# Patient Record
Sex: FEMALE | Race: WHITE | URBAN - METROPOLITAN AREA
[De-identification: names, ages, dates, MRNs, and addresses within clinical notes are randomized per-mention and may not be internally consistent; named-entity substitution may affect disease eponyms.]

---

## 2017-01-06 ENCOUNTER — OFFICE VISIT (OUTPATIENT)
Dept: FAMILY MEDICINE CLINIC | Age: 72
End: 2017-01-06

## 2017-01-06 VITALS
BODY MASS INDEX: 28.49 KG/M2 | TEMPERATURE: 97.6 F | SYSTOLIC BLOOD PRESSURE: 150 MMHG | WEIGHT: 160.8 LBS | OXYGEN SATURATION: 96 % | HEART RATE: 81 BPM | RESPIRATION RATE: 18 BRPM | HEIGHT: 63 IN | DIASTOLIC BLOOD PRESSURE: 92 MMHG

## 2017-01-06 DIAGNOSIS — E78.00 PURE HYPERCHOLESTEROLEMIA: ICD-10-CM

## 2017-01-06 DIAGNOSIS — E55.9 HYPOVITAMINOSIS D: ICD-10-CM

## 2017-01-06 DIAGNOSIS — I10 ESSENTIAL HYPERTENSION: Primary | ICD-10-CM

## 2017-01-06 RX ORDER — METOPROLOL SUCCINATE 50 MG/1
50 TABLET, EXTENDED RELEASE ORAL DAILY
Qty: 90 TAB | Refills: 1 | Status: SHIPPED | OUTPATIENT
Start: 2017-01-06 | End: 2017-03-27 | Stop reason: SDUPTHER

## 2017-01-06 NOTE — MR AVS SNAPSHOT
Visit Information Date & Time Provider Department Dept. Phone Encounter #  
 1/6/2017 11:45 AM Georgia Clemente, Talha E Sandra Miller [phone] [id number] Upcoming Health Maintenance Date Due Pneumococcal 65+ Low/Medium Risk (2 of 2 - PPSV23) 2/22/2015 MEDICARE YEARLY EXAM 3/8/2017 DTaP/Tdap/Td series (2 - Td) 8/25/2017 GLAUCOMA SCREENING Q2Y 10/23/2017 BREAST CANCER SCRN MAMMOGRAM 4/21/2018 COLONOSCOPY 8/12/2024 Allergies as of 1/6/2017  Review Complete On: 1/6/2017 By: Matheus Vela LPN Severity Noted Reaction Type Reactions Penicillin G  01/20/2010   Side Effect Rash Current Immunizations  Reviewed on 11/2/2016 Name Date Influenza High Dose Vaccine PF 9/12/2016, 9/24/2015, 9/25/2013, 9/25/2013 Influenza Vaccine 9/15/2014 Influenza Vaccine Whole 9/20/2012 Pneumococcal Vaccine (Unspecified Type) 2/22/2010 Tdap 8/25/2007 Zoster Vaccine, Live 5/13/2010 Not reviewed this visit You Were Diagnosed With   
  
 Codes Comments Essential hypertension    -  Primary ICD-10-CM: I10 
ICD-9-CM: 401.9 Pure hypercholesterolemia     ICD-10-CM: E78.00 ICD-9-CM: 272.0 Vitals BP Pulse Temp Resp Height(growth percentile) Weight(growth percentile) (!) 150/92 81 97.6 °F (36.4 °C) (Oral) 18 5' 3\" (1.6 m) 160 lb 12.8 oz (72.9 kg) SpO2 BMI OB Status Smoking Status 96% 28.48 kg/m2 Hysterectomy Never Smoker Vitals History BMI and BSA Data Body Mass Index Body Surface Area  
 28.48 kg/m 2 1.8 m 2 Preferred Pharmacy Pharmacy Name Phone ON [address], [address] [facility] Ariadna Clark Your Updated Medication List  
  
   
This list is accurate as of: 1/6/17 12:18 PM.  Always use your most recent med list.  
  
  
  
  
 aspirin 81 mg chewable tablet Take 81 mg by mouth daily. atorvastatin 20 mg tablet Commonly known as:  LIPITOR Take 1 Tab by mouth daily. CALTRATE 600 600 mg (1,500 mg) tablet Take  by mouth two (2) times a day. estrogens (conjugated) 0.45 mg tablet Commonly known as:  PREMARIN Take 1 Tab by mouth daily. FISH OIL 1,000 mg Cap Generic drug:  omega-3 fatty acids-vitamin e Take  by mouth daily. BNLJMZEX-HCDQTUMRAS-FL GLYCN-C PO Take  by mouth daily. metoprolol succinate 50 mg XL tablet Commonly known as:  TOPROL-XL Take 1 Tab by mouth daily. multivitamin tablet Commonly known as:  ONE A DAY Take 1 Tab by mouth daily. VITAMIN B-6 100 mg tablet Generic drug:  pyridoxine (vitamin B6) Take 100 mg by mouth daily. VITAMIN D 2,000 unit Cap capsule Generic drug:  Cholecalciferol (Vitamin D3) Take  by mouth daily. Prescriptions Sent to Pharmacy Refills  
 metoprolol succinate (TOPROL-XL) 50 mg XL tablet 1 Sig: Take 1 Tab by mouth daily. Class: Normal  
 Pharmacy: On [address] #: [phone] Route: Oral  
  
Patient Instructions High Blood Pressure: Care Instructions Your Care Instructions If your blood pressure is usually above 140/90, you have high blood pressure, or hypertension. That means the top number is 140 or higher or the bottom number is 90 or higher, or both. Despite what a lot of people think, high blood pressure usually doesn't cause headaches or make you feel dizzy or lightheaded. It usually has no symptoms. But it does increase your risk for heart attack, stroke, and kidney or eye damage. The higher your blood pressure, the more your risk increases. Your doctor will give you a goal for your blood pressure. Your goal will be based on your health and your age. An example of a goal is to keep your blood pressure below 140/90.  
Lifestyle changes, such as eating healthy and being active, are always important to help lower blood pressure. You might also take medicine to reach your blood pressure goal. 
Follow-up care is a key part of your treatment and safety. Be sure to make and go to all appointments, and call your doctor if you are having problems. It's also a good idea to know your test results and keep a list of the medicines you take. How can you care for yourself at home? Medical treatment · If you stop taking your medicine, your blood pressure will go back up. You may take one or more types of medicine to lower your blood pressure. Be safe with medicines. Take your medicine exactly as prescribed. Call your doctor if you think you are having a problem with your medicine. · Talk to your doctor before you start taking aspirin every day. Aspirin can help certain people lower their risk of a heart attack or stroke. But taking aspirin isn't right for everyone, because it can cause serious bleeding. · See your doctor regularly. You may need to see the doctor more often at first or until your blood pressure comes down. · If you are taking blood pressure medicine, talk to your doctor before you take decongestants or anti-inflammatory medicine, such as ibuprofen. Some of these medicines can raise blood pressure. · Learn how to check your blood pressure at home. Lifestyle changes · Stay at a healthy weight. This is especially important if you put on weight around the waist. Losing even 10 pounds can help you lower your blood pressure. · If your doctor recommends it, get more exercise. Walking is a good choice. Bit by bit, increase the amount you walk every day. Try for at least 30 minutes on most days of the week. You also may want to swim, bike, or do other activities. · Avoid or limit alcohol. Talk to your doctor about whether you can drink any alcohol. · Try to limit how much sodium you eat to less than 2,300 milligrams (mg) a day. Your doctor may ask you to try to eat less than 1,500 mg a day. · Eat plenty of fruits (such as bananas and oranges), vegetables, legumes, whole grains, and low-fat dairy products. · Lower the amount of saturated fat in your diet. Saturated fat is found in animal products such as milk, cheese, and meat. Limiting these foods may help you lose weight and also lower your risk for heart disease. · Do not smoke. Smoking increases your risk for heart attack and stroke. If you need help quitting, talk to your doctor about stop-smoking programs and medicines. These can increase your chances of quitting for good. When should you call for help? Call 911 anytime you think you may need emergency care. This may mean having symptoms that suggest that your blood pressure is causing a serious heart or blood vessel problem. Your blood pressure may be over 180/110. For example, call 911 if: 
· You have symptoms of a heart attack. These may include: ¨ Chest pain or pressure, or a strange feeling in the chest. 
¨ Sweating. ¨ Shortness of breath. ¨ Nausea or vomiting. ¨ Pain, pressure, or a strange feeling in the back, neck, jaw, or upper belly or in one or both shoulders or arms. ¨ Lightheadedness or sudden weakness. ¨ A fast or irregular heartbeat. · You have symptoms of a stroke. These may include: 
¨ Sudden numbness, tingling, weakness, or loss of movement in your face, arm, or leg, especially on only one side of your body. ¨ Sudden vision changes. ¨ Sudden trouble speaking. ¨ Sudden confusion or trouble understanding simple statements. ¨ Sudden problems with walking or balance. ¨ A sudden, severe headache that is different from past headaches. · You have severe back or belly pain. Do not wait until your blood pressure comes down on its own. Get help right away. Call your doctor now or seek immediate care if: 
· Your blood pressure is much higher than normal (such as 180/110 or higher), but you don't have symptoms. · You think high blood pressure is causing symptoms, such as: ¨ Severe headache. ¨ Blurry vision. Watch closely for changes in your health, and be sure to contact your doctor if: 
· Your blood pressure measures 140/90 or higher at least 2 times. That means the top number is 140 or higher or the bottom number is 90 or higher, or both. · You think you may be having side effects from your blood pressure medicine. · Your blood pressure is usually normal, but it goes above normal at least 2 times. Where can you learn more? Go to http://aline-lakhwinder.info/. Enter E155 in the search box to learn more about \"High Blood Pressure: Care Instructions. \" Current as of: August 8, 2016 Content Version: 11.1 © 4843-6458 [employer]. Care instructions adapted under license by [employer] (which disclaims liability or warranty for this information). If you have questions about a medical condition or this instruction, always ask your healthcare professional. [place] [address] any warranty or liability for your use of this information. Introducing [facility]! Juliet Celaya introduces [employer] patient portal. Now you can access parts of your medical record, email your doctor's office, and request medication refills online. 1. In your internet browser, go to [email or web address] 2. Click on the First Time User? Click Here link in the Sign In box. You will see the New Member Sign Up page. 3. Enter your [employer] Access Code exactly as it appears below. You will not need to use this code after youve completed the sign-up process. If you do not sign up before the expiration date, you must request a new code. · [employer] Access Code: [email or web address] Expires: 3/14/2017  8:24 AM 
 
4. Enter the last four digits of your Social Security Number (xxxx) and Date of Birth (mm/dd/yyyy) as indicated and click Submit.  You will be taken to the next sign-up page. 5. Create a [employer] ID. This will be your [employer] login ID and cannot be changed, so think of one that is secure and easy to remember. 6. Create a [employer] password. You can change your password at any time. 7. Enter your Password Reset Question and Answer. This can be used at a later time if you forget your password. 8. Enter your e-mail address. You will receive e-mail notification when new information is available in [address]. 9. Click Sign Up. You can now view and download portions of your medical record. 10. Click the Download Summary menu link to download a portable copy of your medical information. If you have questions, please visit the Frequently Asked Questions section of the [employer] website. Remember, [employer] is NOT to be used for urgent needs. For medical emergencies, dial 911. Now available from your iPhone and Android! Please provide this summary of care documentation to your next provider. Your primary care clinician is listed as Petr 51. If you have any questions after today's visit, please call [phone].

## 2017-01-06 NOTE — PROGRESS NOTES
Assessment/Plan:    Philippe Olguin was seen today for medication evaluation. Diagnoses and all orders for this visit:    Essential hypertension  -     metoprolol succinate (TOPROL-XL) 50 mg XL tablet; Take 1 Tab by mouth daily. Pure hypercholesterolemia  -     CBC WITH AUTOMATED DIFF; Future  -     HEPATIC FUNCTION PANEL; Future  -     LIPID PANEL; Future  -     METABOLIC PANEL, BASIC; Future  -     TSH 3RD GENERATION; Future  -     T4, FREE; Future  -     URINALYSIS W/ RFLX MICROSCOPIC; Future  -     VITAMIN D, 25 HYDROXY; Future    Hypovitaminosis D  -     VITAMIN D, 25 HYDROXY; Future        Patient was instructed to call us the week of 1/16/17 to give up readings. Based on this, will adjust medication before her next visit. Physical in March 2017. BW. The plan was discussed with the patient. The patient verbalized understanding and is in agreement with the plan. All medication potential side effects were discussed with the patient.    -------------------------------------------------------------------------------------------------------------------        Thana Spurling is a 66 y.o. female and presents with Medication Evaluation         Subjective:  Pt here for f/u of HTN. We changed her to new med, Toprol 25 mg.  Readings still elevated. Tolerating med fine and she brought in her BP log. Her readings at home are similar to here. ROS:  Constitutional: No recent weight change. No weakness/fatigue. No f/c. Skin: No rashes, change in nails/hair, itching   HENT: No HA, dizziness. No hearing loss/tinnitus. No nasal congestion/discharge. Eyes: No change in vision, double/blurred vision or eye pain/redness. Cardiovascular: No CP/palpitations. No ARANA/orthopnea/PND. Respiratory: No cough/sputum, dyspnea, wheezing. Gastointestinal: No dysphagia, reflux. No n/v. No constipation/diarrhea. No melena/rectal bleeding. Genitourinary: No dysuria, urinary hesitancy, nocturia, hematuria.   No incontinence. Musculoskeletal: No joint pain/stiffness. No muscle pain/tenderness. Endo: No heat/cold intolerance, no polyuria/polydypsia. Heme: No h/o anemia. No easy bleeding/bruising. Allergy/Immunology: No seasonal rhinitis. Denies frequent colds, sinus/ear infections. Neurological: No seizures/numbness/weakness. No paresthesias. Psychiatric:  No depression, anxiety. The problem list was updated as a part of today's visit. Patient Active Problem List   Diagnosis Code    Postmenopausal Z78.0    Hyperlipidemia E78.5    Advance care planning Z71.89    Essential hypertension I10       The PSH, FH were reviewed. SH:  Social History   Substance Use Topics    Smoking status: Never Smoker    Smokeless tobacco: Never Used    Alcohol use 1.0 oz/week     2 Glasses of wine per week       Medications/Allergies:  Current Outpatient Prescriptions on File Prior to Visit   Medication Sig Dispense Refill    atorvastatin (LIPITOR) 20 mg tablet Take 1 Tab by mouth daily. 90 Tab 2    estrogens, conjugated, (PREMARIN) 0.45 mg tablet Take 1 Tab by mouth daily. 90 Tab 2    Cholecalciferol, Vitamin D3, (VITAMIN D) 2,000 unit Cap Take  by mouth daily.  multivitamin (ONE A DAY) tablet Take 1 Tab by mouth daily.  pyridoxine (VITAMIN B-6) 100 mg tablet Take 100 mg by mouth daily.  aspirin 81 mg chewable tablet Take 81 mg by mouth daily.  omega-3 fatty acids-vitamin e (FISH OIL) 1,000 mg Cap Take  by mouth daily.  calcium carbonate (CALTRATE 600) 1,500 (600) mg Tab Take  by mouth two (2) times a day.  GLUC HCL/CSANA/GLY-AM-GLY,MX/C (YNPESHPT-YXGRISKVOE-JH GLYCN-C PO) Take  by mouth daily. No current facility-administered medications on file prior to visit.          Allergies   Allergen Reactions    Penicillin G Rash         Health Maintenance:   Health Maintenance   Topic Date Due    Pneumococcal 65+ Low/Medium Risk (2 of 2 - PPSV23) 02/22/2015    MEDICARE YEARLY EXAM 03/08/2017    DTaP/Tdap/Td series (2 - Td) 08/25/2017    GLAUCOMA SCREENING Q2Y  10/23/2017    BREAST CANCER SCRN MAMMOGRAM  04/21/2018    COLONOSCOPY  08/12/2024    OSTEOPOROSIS SCREENING (DEXA)  Completed    ZOSTER VACCINE AGE 60>  Completed    INFLUENZA AGE 9 TO ADULT  Completed       Objective:  Visit Vitals    BP (!) 150/92    Pulse 81    Temp 97.6 °F (36.4 °C) (Oral)    Resp 18    Ht 5' 3\" (1.6 m)    Wt 160 lb 12.8 oz (72.9 kg)    SpO2 96%    BMI 28.48 kg/m2          Nurses notes and VS reviewed. Physical Examination: General appearance - alert, well appearing, and in no distress        Labwork and Ancillary Studies:    CBC w/Diff  Lab Results   Component Value Date/Time    WBC 5.5 02/29/2016 08:34 AM    HGB 13.1 02/29/2016 08:34 AM    PLATELET 798 89/99/1147 08:34 AM         Basic Metabolic Profile  Lab Results   Component Value Date/Time    Sodium 142 02/29/2016 08:34 AM    Potassium 4.4 02/29/2016 08:34 AM    Chloride 108 02/29/2016 08:34 AM    CO2 27 02/29/2016 08:34 AM    Anion gap 7 02/29/2016 08:34 AM    Glucose 93 02/29/2016 08:34 AM    BUN 14 02/29/2016 08:34 AM    Creatinine 0.63 02/29/2016 08:34 AM    BUN/Creatinine ratio 22 02/29/2016 08:34 AM    GFR est AA >60 02/29/2016 08:34 AM    GFR est non-AA >60 02/29/2016 08:34 AM    Calcium 8.6 02/29/2016 08:34 AM         LFT  Lab Results   Component Value Date/Time    ALT 34 02/29/2016 08:34 AM    AST 24 02/29/2016 08:34 AM    Alk.  phosphatase 59 02/29/2016 08:34 AM    Bilirubin, direct 0.1 02/29/2016 08:34 AM    Bilirubin, total 0.4 02/29/2016 08:34 AM         Cholesterol  Lab Results   Component Value Date/Time    Cholesterol, total 187 09/12/2016 09:51 AM    HDL Cholesterol 90 09/12/2016 09:51 AM    LDL, calculated 67.2 09/12/2016 09:51 AM    Triglyceride 149 09/12/2016 09:51 AM    CHOL/HDL Ratio 2.1 09/12/2016 09:51 AM

## 2017-01-06 NOTE — PATIENT INSTRUCTIONS

## 2017-01-17 ENCOUNTER — TELEPHONE (OUTPATIENT)
Dept: FAMILY MEDICINE CLINIC | Age: 72
End: 2017-01-17

## 2017-01-17 NOTE — TELEPHONE ENCOUNTER
Patient called, states she was just letting you know how her BP is doing, last night it was 139/70, coming down gradually. Patient to continue what she is doing.

## 2017-01-26 DIAGNOSIS — Z78.0 POSTMENOPAUSAL: ICD-10-CM

## 2017-01-26 RX ORDER — ATORVASTATIN CALCIUM 20 MG/1
TABLET, FILM COATED ORAL
Qty: 90 TAB | Refills: 2 | Status: SHIPPED | OUTPATIENT
Start: 2017-01-26 | End: 2017-10-25 | Stop reason: SDUPTHER

## 2017-01-26 RX ORDER — ESTROGENS, CONJUGATED 0.45 MG/1
TABLET, FILM COATED ORAL
Qty: 90 TAB | Refills: 2 | Status: SHIPPED | OUTPATIENT
Start: 2017-01-26 | End: 2017-05-02 | Stop reason: SDUPTHER

## 2017-02-03 ENCOUNTER — TELEPHONE (OUTPATIENT)
Dept: FAMILY MEDICINE CLINIC | Age: 72
End: 2017-02-03

## 2017-02-23 ENCOUNTER — HOSPITAL ENCOUNTER (OUTPATIENT)
Dept: LAB | Age: 72
Discharge: HOME | End: 2017-02-23
Payer: MEDICARE

## 2017-02-23 DIAGNOSIS — E78.00 PURE HYPERCHOLESTEROLEMIA: ICD-10-CM

## 2017-02-23 DIAGNOSIS — E55.9 HYPOVITAMINOSIS D: ICD-10-CM

## 2017-02-23 LAB
25(OH)D3 SERPL-MCNC: 40.7 NG/ML (ref 30–100)
ALBUMIN SERPL BCP-MCNC: 3.5 G/DL (ref 3.4–5)
ALBUMIN/GLOB SERPL: 1.2 {RATIO} (ref 0.8–1.7)
ALP SERPL-CCNC: 68 U/L (ref 45–117)
ALT SERPL-CCNC: 37 U/L (ref 13–56)
ANION GAP BLD CALC-SCNC: 7 MMOL/L (ref 3–18)
APPEARANCE UR: CLEAR
AST SERPL W P-5'-P-CCNC: 24 U/L (ref 15–37)
BACTERIA URNS QL MICRO: ABNORMAL /HPF
BASOPHILS # BLD AUTO: 0 K/UL (ref 0–0.06)
BASOPHILS # BLD: 0 % (ref 0–2)
BILIRUB DIRECT SERPL-MCNC: 0.1 MG/DL (ref 0–0.2)
BILIRUB SERPL-MCNC: 0.3 MG/DL (ref 0.2–1)
BILIRUB UR QL: NEGATIVE
BUN SERPL-MCNC: 17 MG/DL (ref 7–18)
BUN/CREAT SERPL: 25 (ref 12–20)
CALCIUM SERPL-MCNC: 8.8 MG/DL (ref 8.5–10.1)
CHLORIDE SERPL-SCNC: 105 MMOL/L (ref 100–108)
CHOLEST SERPL-MCNC: 190 MG/DL
CO2 SERPL-SCNC: 29 MMOL/L (ref 21–32)
COLOR UR: YELLOW
CREAT SERPL-MCNC: 0.67 MG/DL (ref 0.6–1.3)
DIFFERENTIAL METHOD BLD: NORMAL
EOSINOPHIL # BLD: 0.1 K/UL (ref 0–0.4)
EOSINOPHIL NFR BLD: 2 % (ref 0–5)
EPITH CASTS URNS QL MICRO: ABNORMAL /LPF (ref 0–5)
ERYTHROCYTE [DISTWIDTH] IN BLOOD BY AUTOMATED COUNT: 13.4 % (ref 11.6–14.5)
GLOBULIN SER CALC-MCNC: 3 G/DL (ref 2–4)
GLUCOSE SERPL-MCNC: 87 MG/DL (ref 74–99)
GLUCOSE UR STRIP.AUTO-MCNC: NEGATIVE MG/DL
HCT VFR BLD AUTO: 42.4 % (ref 35–45)
HDLC SERPL-MCNC: 97 MG/DL (ref 40–60)
HDLC SERPL: 2 {RATIO} (ref 0–5)
HGB BLD-MCNC: 13.3 G/DL (ref 12–16)
HGB UR QL STRIP: NEGATIVE
KETONES UR QL STRIP.AUTO: NEGATIVE MG/DL
LDLC SERPL CALC-MCNC: 66 MG/DL (ref 0–100)
LEUKOCYTE ESTERASE UR QL STRIP.AUTO: ABNORMAL
LIPID PROFILE,FLP: ABNORMAL
LYMPHOCYTES # BLD AUTO: 32 % (ref 21–52)
LYMPHOCYTES # BLD: 1.8 K/UL (ref 0.9–3.6)
MCH RBC QN AUTO: 29.4 PG (ref 24–34)
MCHC RBC AUTO-ENTMCNC: 31.4 G/DL (ref 31–37)
MCV RBC AUTO: 93.6 FL (ref 74–97)
MONOCYTES # BLD: 0.5 K/UL (ref 0.05–1.2)
MONOCYTES NFR BLD AUTO: 9 % (ref 3–10)
NEUTS SEG # BLD: 3.3 K/UL (ref 1.8–8)
NEUTS SEG NFR BLD AUTO: 57 % (ref 40–73)
NITRITE UR QL STRIP.AUTO: NEGATIVE
PH UR STRIP: 8 [PH] (ref 5–8)
PLATELET # BLD AUTO: 232 K/UL (ref 135–420)
PMV BLD AUTO: 11.3 FL (ref 9.2–11.8)
POTASSIUM SERPL-SCNC: 4.2 MMOL/L (ref 3.5–5.5)
PROT SERPL-MCNC: 6.5 G/DL (ref 6.4–8.2)
PROT UR STRIP-MCNC: NEGATIVE MG/DL
RBC # BLD AUTO: 4.53 M/UL (ref 4.2–5.3)
RBC #/AREA URNS HPF: ABNORMAL /HPF (ref 0–5)
SODIUM SERPL-SCNC: 141 MMOL/L (ref 136–145)
SP GR UR REFRACTOMETRY: 1.02 (ref 1–1.03)
T4 FREE SERPL-MCNC: 1 NG/DL (ref 0.7–1.5)
TRIGL SERPL-MCNC: 135 MG/DL (ref ?–150)
TSH SERPL DL<=0.05 MIU/L-ACNC: 2.97 UIU/ML (ref 0.36–3.74)
UROBILINOGEN UR QL STRIP.AUTO: 0.2 EU/DL (ref 0.2–1)
VLDLC SERPL CALC-MCNC: 27 MG/DL
WBC # BLD AUTO: 5.8 K/UL (ref 4.6–13.2)
WBC URNS QL MICRO: ABNORMAL /HPF (ref 0–4)

## 2017-02-23 PROCEDURE — 84443 ASSAY THYROID STIM HORMONE: CPT | Performed by: INTERNAL MEDICINE

## 2017-02-23 PROCEDURE — 82306 VITAMIN D 25 HYDROXY: CPT | Performed by: INTERNAL MEDICINE

## 2017-02-23 PROCEDURE — 80061 LIPID PANEL: CPT | Performed by: INTERNAL MEDICINE

## 2017-02-23 PROCEDURE — 80048 BASIC METABOLIC PNL TOTAL CA: CPT | Performed by: INTERNAL MEDICINE

## 2017-02-23 PROCEDURE — 85025 COMPLETE CBC W/AUTO DIFF WBC: CPT | Performed by: INTERNAL MEDICINE

## 2017-02-23 PROCEDURE — 81001 URINALYSIS AUTO W/SCOPE: CPT | Performed by: INTERNAL MEDICINE

## 2017-02-23 PROCEDURE — 36415 COLL VENOUS BLD VENIPUNCTURE: CPT | Performed by: INTERNAL MEDICINE

## 2017-02-23 PROCEDURE — 80076 HEPATIC FUNCTION PANEL: CPT | Performed by: INTERNAL MEDICINE

## 2017-02-23 PROCEDURE — 84439 ASSAY OF FREE THYROXINE: CPT | Performed by: INTERNAL MEDICINE

## 2017-03-02 ENCOUNTER — OFFICE VISIT (OUTPATIENT)
Dept: FAMILY MEDICINE CLINIC | Age: 72
End: 2017-03-02

## 2017-03-02 VITALS
WEIGHT: 161 LBS | OXYGEN SATURATION: 96 % | DIASTOLIC BLOOD PRESSURE: 90 MMHG | HEIGHT: 63 IN | RESPIRATION RATE: 20 BRPM | BODY MASS INDEX: 28.53 KG/M2 | HEART RATE: 70 BPM | SYSTOLIC BLOOD PRESSURE: 150 MMHG

## 2017-03-02 DIAGNOSIS — Z13.39 SCREENING FOR ALCOHOLISM: ICD-10-CM

## 2017-03-02 DIAGNOSIS — Z23 ENCOUNTER FOR IMMUNIZATION: ICD-10-CM

## 2017-03-02 DIAGNOSIS — Z13.1 SCREENING FOR DIABETES MELLITUS: ICD-10-CM

## 2017-03-02 DIAGNOSIS — Z12.39 BREAST CANCER SCREENING: ICD-10-CM

## 2017-03-02 DIAGNOSIS — Z00.00 ROUTINE GENERAL MEDICAL EXAMINATION AT A HEALTH CARE FACILITY: ICD-10-CM

## 2017-03-02 DIAGNOSIS — N95.1 MENOPAUSAL SYMPTOM: ICD-10-CM

## 2017-03-02 DIAGNOSIS — I10 ESSENTIAL HYPERTENSION: Primary | ICD-10-CM

## 2017-03-02 DIAGNOSIS — Z13.6 SCREENING FOR ISCHEMIC HEART DISEASE: ICD-10-CM

## 2017-03-02 DIAGNOSIS — Z13.820 OSTEOPOROSIS SCREENING: ICD-10-CM

## 2017-03-02 RX ORDER — CHLORTHALIDONE 25 MG/1
25 TABLET ORAL DAILY
Qty: 90 TAB | Refills: 1 | Status: SHIPPED | OUTPATIENT
Start: 2017-03-02 | End: 2017-08-21 | Stop reason: SDUPTHER

## 2017-03-02 NOTE — PROGRESS NOTES
Oli Reynolds is a 66 y.o. female who presents today for annual wellness exam. Pain scale 0/10    Health Maintenance Due   Topic Date Due    Pneumococcal 65+ Low/Medium Risk (2 of 2 - PPSV23) 02/22/2015           1. Have you been to the ER, urgent care clinic since your last visit? Hospitalized since your last visit? No    2. Have you seen or consulted any other health care providers outside of the [address] [place] [address] since your last visit? Include any pap smears or colon screening. Yes Where: Opthalmology    Learning Assessment 3/25/2015   PRIMARY LEARNER Patient   HIGHEST LEVEL OF EDUCATION - PRIMARY LEARNER  > 4 YEARS OF COLLEGE   BARRIERS PRIMARY LEARNER NONE   CO-LEARNER CAREGIVER No   PRIMARY LANGUAGE ENGLISH   LEARNER PREFERENCE PRIMARY DEMONSTRATION   ANSWERED BY self   RELATIONSHIP SELF       Abuse Screening Questionnaire 3/7/2016   Do you ever feel afraid of your partner? N   Are you in a relationship with someone who physically or mentally threatens you? N   Is it safe for you to go home?  Ayah Cordon

## 2017-03-02 NOTE — PROGRESS NOTES
Assessment/Plan:    *Eva Escobar was seen today for annual wellness visit. Diagnoses and all orders for this visit:    Essential hypertension  -     chlorthalidone (HYGROTEN) 25 mg tablet; Take 1 Tab by mouth daily. Routine general medical examination at a health care facility    Screening for alcoholism    Encounter for immunization    Screening for diabetes mellitus    Screening for ischemic heart disease    Breast cancer screening  -     [facility] MAMMO BI SCREENING INCL CAD; Future    Osteoporosis screening  -     DEXA BONE DENSITY STUDY AXIAL; Future    Menopausal symptom  -     DEXA BONE DENSITY STUDY AXIAL; Future    Other orders  -     pneumococcal 13 eloy conj dip (PREVNAR 13, PF,) 0.5 mL syrg injection; 0.5 mL by IntraMUSCular route once for 1 dose. Will cut the Toprol in half and add Chlorthalidone. The plan was discussed with the patient. The patient verbalized understanding and is in agreement with the plan. All medication potential side effects were discussed with the patient.    -------------------------------------------------------------------------------------------------------------------        Josette Alvarez is a 66 y.o. female and presents with Annual Wellness Visit         Subjective:  Pt here for f/u. HTN: still not at goal here or at home. ROS:  Constitutional: No recent weight change. No weakness/fatigue. No f/c. Skin: No rashes, change in nails/hair, itching   HENT: No HA, dizziness. No hearing loss/tinnitus. No nasal congestion/discharge. Eyes: No change in vision, double/blurred vision or eye pain/redness. Cardiovascular: No CP/palpitations. No ARANA/orthopnea/PND. Respiratory: No cough/sputum, dyspnea, wheezing. Gastointestinal: No dysphagia, reflux. No n/v. No constipation/diarrhea. No melena/rectal bleeding. Genitourinary: No dysuria, urinary hesitancy, nocturia, hematuria. No incontinence. Musculoskeletal: No joint pain/stiffness.   No muscle pain/tenderness. Endo: No heat/cold intolerance, no polyuria/polydypsia. Heme: No h/o anemia. No easy bleeding/bruising. Allergy/Immunology: No seasonal rhinitis. Denies frequent colds, sinus/ear infections. Neurological: No seizures/numbness/weakness. No paresthesias. Psychiatric:  No depression, anxiety. The problem list was updated as a part of today's visit. Patient Active Problem List   Diagnosis Code    Postmenopausal Z78.0    Hyperlipidemia E78.5    Advance care planning Z71.89    Essential hypertension I10       The PSH, FH were reviewed. SH:  Social History   Substance Use Topics    Smoking status: Never Smoker    Smokeless tobacco: Never Used    Alcohol use 1.0 oz/week     2 Glasses of wine per week       Medications/Allergies:  Current Outpatient Prescriptions on File Prior to Visit   Medication Sig Dispense Refill    PREMARIN 0.45 mg tablet TAKE ONE TABLET BY MOUTH EVERY DAY 90 Tab 2    atorvastatin (LIPITOR) 20 mg tablet TAKE ONE TABLET BY MOUTH EVERY DAY 90 Tab 2    metoprolol succinate (TOPROL-XL) 50 mg XL tablet Take 1 Tab by mouth daily. 90 Tab 1    Cholecalciferol, Vitamin D3, (VITAMIN D) 2,000 unit Cap Take  by mouth daily.  multivitamin (ONE A DAY) tablet Take 1 Tab by mouth daily.  pyridoxine (VITAMIN B-6) 100 mg tablet Take 100 mg by mouth daily.  aspirin 81 mg chewable tablet Take 81 mg by mouth daily.  omega-3 fatty acids-vitamin e (FISH OIL) 1,000 mg Cap Take  by mouth daily.  calcium carbonate (CALTRATE 600) 1,500 (600) mg Tab Take  by mouth two (2) times a day.  GLUC HCL/CSANA/GLY-AM-GLY,MX/C (BDYGIMHC-UZMRFDNNFZ-XL GLYCN-C PO) Take  by mouth daily. No current facility-administered medications on file prior to visit.          Allergies   Allergen Reactions    Ace Inhibitors Cough    Penicillin G Rash         Health Maintenance:   Health Maintenance   Topic Date Due    Pneumococcal 65+ Low/Medium Risk (2 of 2 - PPSV23) 02/22/2015    MEDICARE YEARLY EXAM  03/08/2017    DTaP/Tdap/Td series (2 - Td) 08/25/2017    GLAUCOMA SCREENING Q2Y  10/23/2017    BREAST CANCER SCRN MAMMOGRAM  04/21/2018    COLONOSCOPY  08/12/2024    OSTEOPOROSIS SCREENING (DEXA)  Completed    ZOSTER VACCINE AGE 60>  Completed    INFLUENZA AGE 9 TO ADULT  Completed       Objective:  Visit Vitals    /90 (BP 1 Location: Left arm, BP Patient Position: Sitting)    Pulse 70    Resp 20    Ht 5' 3\" (1.6 m)    Wt 161 lb (73 kg)    SpO2 96%    BMI 28.52 kg/m2          Nurses notes and VS reviewed. Physical Examination: sed other note. Labwork and Ancillary Studies:    CBC w/Diff  Lab Results   Component Value Date/Time    WBC 5.8 02/23/2017 07:25 AM    HGB 13.3 02/23/2017 07:25 AM    PLATELET 712 17/36/7473 07:25 AM         Basic Metabolic Profile  Lab Results   Component Value Date/Time    Sodium 141 02/23/2017 07:25 AM    Potassium 4.2 02/23/2017 07:25 AM    Chloride 105 02/23/2017 07:25 AM    CO2 29 02/23/2017 07:25 AM    Anion gap 7 02/23/2017 07:25 AM    Glucose 87 02/23/2017 07:25 AM    BUN 17 02/23/2017 07:25 AM    Creatinine 0.67 02/23/2017 07:25 AM    BUN/Creatinine ratio 25 02/23/2017 07:25 AM    GFR est AA >60 02/23/2017 07:25 AM    GFR est non-AA >60 02/23/2017 07:25 AM    Calcium 8.8 02/23/2017 07:25 AM         LFT  Lab Results   Component Value Date/Time    ALT (SGPT) 37 02/23/2017 07:25 AM    AST (SGOT) 24 02/23/2017 07:25 AM    Alk.  phosphatase 68 02/23/2017 07:25 AM    Bilirubin, direct 0.1 02/23/2017 07:25 AM    Bilirubin, total 0.3 02/23/2017 07:25 AM         Cholesterol  Lab Results   Component Value Date/Time    Cholesterol, total 190 02/23/2017 07:25 AM    HDL Cholesterol 97 02/23/2017 07:25 AM    LDL, calculated 66 02/23/2017 07:25 AM    Triglyceride 135 02/23/2017 07:25 AM    CHOL/HDL Ratio 2.0 02/23/2017 07:25 AM

## 2017-03-02 NOTE — PATIENT INSTRUCTIONS
Medicare Part B Preventive Services Limitations Recommendation Scheduled   Bone Mass Measurement  (age 72 & older, biennial) Requires diagnosis related to osteoporosis or estrogen deficiency. Biennial benefit unless patient has history of long-term glucocorticoid tx or baseline is needed because initial test was by other method     Cardiovascular Screening Blood Tests (every 5 years)  Total cholesterol, HDL, Triglycerides Order as a panel if possible     Colorectal Cancer Screening  -Fecal occult blood test (annual)  -Flexible sigmoidoscopy (5y)  -Screening colonoscopy (10y)  -Barium Enema      Counseling to Prevent Tobacco Use (up to 8 sessions per year)  - Counseling greater than 3 and up to 10 minutes  - Counseling greater than 10 minutes Patients must be asymptomatic of tobacco-related conditions to receive as preventive service     Diabetes Screening Tests (at least every 3 years, Medicare covers annually or at 6-month intervals for prediabetic patients)    Fasting blood sugar (FBS) or glucose tolerance test (GTT) Patient must be diagnosed with one of the following:  -Hypertension, Dyslipidemia, obesity, previous impaired FBS or GTT  Or any two of the following: overweight, FH of diabetes, age ? 72, history of gestational diabetes, birth of baby weighing more than 9 pounds     Diabetes Self-Management Training (DSMT) (no USPSTF recommendation) Requires referral by treating physician for patient with diabetes or renal disease. 10 hours of initial DSMT session of no less than 30 minutes each in a continuous 12-month period. 2 hours of follow-up DSMT in subsequent years.      Glaucoma Screening (no USPSTF recommendation) Diabetes mellitus, family history, , age 48 or over,  American, age 72 or over     Human Immunodeficiency Virus (HIV) Screening (annually for increased risk patients)  HIV-1 and HIV-2 by EIA, ROLLY, rapid antibody test, or oral mucosa transudate Patient must be at increased risk for HIV infection per USPSTF guidelines or pregnant. Tests covered annually for patients at increased risk. Pregnant patients may receive up to 3 test during pregnancy. Medical Nutrition Therapy (MNT) (for diabetes or renal disease not recommended schedule) Requires referral by treating physician for patient with diabetes or renal disease. Can be provided in same year as diabetes self-management training (DSMT), and CMS recommends medical nutrition therapy take place after DSMT. Up to 3 hours for initial year and 2 hours in subsequent years. Shingles Vaccination A shingles vaccine is also recommended once in a lifetime after age 61     Seasonal Influenza Vaccination (annually)      Pneumococcal Vaccination (once after 72)      Hepatitis B Vaccinations (if medium/high risk) Medium/high risk factors:  End-stage renal disease,  Hemophiliacs who received Factor VIII or IX concentrates, Clients of institutions for the mentally retarded, Persons who live in the same house as a HepB virus carrier, Homosexual men, Illicit injectable drug abusers. Screening Mammography (biennial age 54-69) Annually (age 36 or over)     Screening Pap Tests and Pelvic Examination (up to age 79 and after 79 if unknown history or abnormal study last 10 years) Every 25 months except high risk     Ultrasound Screening for Abdominal Aortic Aneurysm (AAA) (once) Patient must be referred through [facility] and not have had a screening for abdominal aortic aneurysm before under Medicare.   Limited to patients who meet one of the following criteria:  - Men who are 73-68 years old and have smoked more than 100 cigarettes in their lifetime.  -Anyone with a FH of AAA  -Anyone recommended for screening by USPSTF

## 2017-03-02 NOTE — MR AVS SNAPSHOT
Visit Information Date & Time Provider Department Dept. Phone Encounter #  
 3/2/2017  8:30 AM Kellie Yao, [facility] [phone] [id number] Upcoming Health Maintenance Date Due Pneumococcal 65+ Low/Medium Risk (2 of 2 - PPSV23) 2/22/2015 MEDICARE YEARLY EXAM 3/8/2017 DTaP/Tdap/Td series (2 - Td) 8/25/2017 GLAUCOMA SCREENING Q2Y 10/23/2017 BREAST CANCER SCRN MAMMOGRAM 4/21/2018 COLONOSCOPY 8/12/2024 Allergies as of 3/2/2017  Review Complete On: 3/2/2017 By: Kellie Yao MD  
  
 Severity Noted Reaction Type Reactions Ace Inhibitors  03/02/2017    Cough Penicillin G  01/20/2010   Side Effect Rash Current Immunizations  Reviewed on 3/2/2017 Name Date Influenza High Dose Vaccine PF 9/12/2016, 9/24/2015, 9/25/2013, 9/25/2013 Influenza Vaccine 9/15/2014 Influenza Vaccine Whole 9/20/2012 Pneumococcal Vaccine (Unspecified Type) 2/22/2010 Tdap 8/25/2007 Zoster Vaccine, Live 5/13/2010 Reviewed by Kellie Yao MD on 3/2/2017 at  9:02 AM  
 Reviewed by Kellie Yao MD on 3/2/2017 at  9:02 AM  
You Were Diagnosed With   
  
 Codes Comments Routine general medical examination at a health care facility    -  Primary ICD-10-CM: Z00.00 ICD-9-CM: V70.0 Screening for alcoholism     ICD-10-CM: Z13.89 ICD-9-CM: V79.1 Encounter for immunization     ICD-10-CM: A90 ICD-9-CM: V03.89 Screening for diabetes mellitus     ICD-10-CM: Z13.1 ICD-9-CM: V77.1 Screening for ischemic heart disease     ICD-10-CM: Z13.6 ICD-9-CM: V81.0 Breast cancer screening     ICD-10-CM: Z12.39 
ICD-9-CM: V76.10 Osteoporosis screening     ICD-10-CM: Z13.820 ICD-9-CM: V82.81 Menopausal symptom     ICD-10-CM: N95.1 ICD-9-CM: 627.2 Essential hypertension     ICD-10-CM: I10 
ICD-9-CM: 401.9 Vitals BP  
  
  
  
  
  
 150/90 (BP 1 Location: Left arm, BP Patient Position: Sitting) Vitals History BMI and BSA Data Body Mass Index Body Surface Area 28.52 kg/m 2 1.8 m 2 Preferred Pharmacy Pharmacy Name Phone ON [facility], [address] [place] [address] Kemar Padgett Your Updated Medication List  
  
   
This list is accurate as of: 3/2/17  9:17 AM.  Always use your most recent med list.  
  
  
  
  
 aspirin 81 mg chewable tablet Take 81 mg by mouth daily. atorvastatin 20 mg tablet Commonly known as:  LIPITOR  
TAKE ONE TABLET BY MOUTH EVERY DAY  
  
 CALTRATE 600 600 mg calcium (1,500 mg) tablet Take  by mouth two (2) times a day. chlorthalidone 25 mg tablet Commonly known as:  Wilhemena Nickels Take 1 Tab by mouth daily. FISH OIL 1,000 mg Cap Generic drug:  omega-3 fatty acids-vitamin e Take  by mouth daily. FTXUJXOG-AZKYFBUNAU-ZD GLYCN-C PO Take  by mouth daily. metoprolol succinate 50 mg XL tablet Commonly known as:  TOPROL-XL Take 1 Tab by mouth daily. multivitamin tablet Commonly known as:  ONE A DAY Take 1 Tab by mouth daily. pneumococcal 13 eloy conj dip 0.5 mL Syrg injection Commonly known as:  PREVNAR 13 (PF)  
0.5 mL by IntraMUSCular route once for 1 dose. PREMARIN 0.45 mg tablet Generic drug:  estrogens (conjugated) TAKE ONE TABLET BY MOUTH EVERY DAY  
  
 VITAMIN B-6 100 mg tablet Generic drug:  pyridoxine (vitamin B6) Take 100 mg by mouth daily. VITAMIN D 2,000 unit Cap capsule Generic drug:  Cholecalciferol (Vitamin D3) Take  by mouth daily. Prescriptions Printed Refills  
 pneumococcal 13 eloy conj dip (PREVNAR 13, PF,) 0.5 mL syrg injection 0 Si.5 mL by IntraMUSCular route once for 1 dose. Class: Print Route: IntraMUSCular Prescriptions Sent to Pharmacy Refills  
 chlorthalidone (HYGROTEN) 25 mg tablet 1 Sig: Take 1 Tab by mouth daily.   
 Class: Normal  
 Pharmacy: On [address], [address] [facility] [place]  #: [phone] Route: Oral  
  
To-Do List   
 03/02/2017 Imaging:  DEXA BONE DENSITY STUDY AXIAL   
  
 03/02/2017 Imaging:  SHASHI MAMMO BI SCREENING INCL CAD Patient Instructions Medicare Part B Preventive Services Limitations Recommendation Scheduled Bone Mass Measurement 
(age 72 & older, biennial) Requires diagnosis related to osteoporosis or estrogen deficiency. Biennial benefit unless patient has history of long-term glucocorticoid tx or baseline is needed because initial test was by other method Cardiovascular Screening Blood Tests (every 5 years) Total cholesterol, HDL, Triglycerides Order as a panel if possible Colorectal Cancer Screening 
-Fecal occult blood test (annual) -Flexible sigmoidoscopy (5y) 
-Screening colonoscopy (10y) -Barium Enema Counseling to Prevent Tobacco Use (up to 8 sessions per year) - Counseling greater than 3 and up to 10 minutes - Counseling greater than 10 minutes Patients must be asymptomatic of tobacco-related conditions to receive as preventive service Diabetes Screening Tests (at least every 3 years, Medicare covers annually or at 6-month intervals for prediabetic patients) Fasting blood sugar (FBS) or glucose tolerance test (GTT) Patient must be diagnosed with one of the following: 
-Hypertension, Dyslipidemia, obesity, previous impaired FBS or GTT 
Or any two of the following: overweight, FH of diabetes, age ? 72, history of gestational diabetes, birth of baby weighing more than 9 pounds Diabetes Self-Management Training (DSMT) (no USPSTF recommendation) Requires referral by treating physician for patient with diabetes or renal disease. 10 hours of initial DSMT session of no less than 30 minutes each in a continuous 12-month period. 2 hours of follow-up DSMT in subsequent years. Glaucoma Screening (no USPSTF recommendation) Diabetes mellitus, family history, , age 48 or over,  American, age 72 or over Human Immunodeficiency Virus (HIV) Screening (annually for increased risk patients) HIV-1 and HIV-2 by EIA, ROLLY, rapid antibody test, or oral mucosa transudate Patient must be at increased risk for HIV infection per USPSTF guidelines or pregnant. Tests covered annually for patients at increased risk. Pregnant patients may receive up to 3 test during pregnancy. Medical Nutrition Therapy (MNT) (for diabetes or renal disease not recommended schedule) Requires referral by treating physician for patient with diabetes or renal disease. Can be provided in same year as diabetes self-management training (DSMT), and CMS recommends medical nutrition therapy take place after DSMT. Up to 3 hours for initial year and 2 hours in subsequent years. Shingles Vaccination A shingles vaccine is also recommended once in a lifetime after age 61 Seasonal Influenza Vaccination (annually) Pneumococcal Vaccination (once after 65) Hepatitis B Vaccinations (if medium/high risk) Medium/high risk factors:  End-stage renal disease, Hemophiliacs who received Factor VIII or IX concentrates, Clients of institutions for the mentally retarded, Persons who live in the same house as a HepB virus carrier, Homosexual men, Illicit injectable drug abusers. Screening Mammography (biennial age 54-69) Annually (age 36 or over) Screening Pap Tests and Pelvic Examination (up to age 79 and after 79 if unknown history or abnormal study last 10 years) Every 24 months except high risk Ultrasound Screening for Abdominal Aortic Aneurysm (AAA) (once) Patient must be referred through [facility] and not have had a screening for abdominal aortic aneurysm before under Medicare.   Limited to patients who meet one of the following criteria: 
 - Men who are 73-68 years old and have smoked more than 100 cigarettes in their lifetime. 
-Anyone with a FH of AAA 
-Anyone [facility] by [facility] Introducing [facility]! Karina Prado introduces Winte[employer] patient portal. Now you can access parts of your medical record, email your doctor's office, and request medication refills online. 1. In your internet browser, go to [email or web address] 2. Click on the First Time User? Click Here link in the Sign In box. You will see the New Member Sign Up page. 3. Enter your Winte[employer] Access Code exactly as it appears below. You will not need to use this code after youve completed the sign-up process. If you do not sign up before the expiration date, you must request a new code. · Winte[employer] Access Code: [email or web address] Expires: 3/14/2017  8:24 AM 
 
4. Enter the last four digits of your Social Security Number (xxxx) and Date of Birth (mm/dd/yyyy) as indicated and click Submit. You will be taken to the next sign-up page. 5. Create a Wintegra ID. This will be your Wintegra login ID and cannot be changed, so think of one that is secure and easy to remember. 6. Create a Winte[employer] password. You can change your password at any time. 7. Enter your Password Reset Question and Answer. This can be used at a later time if you forget your password. 8. Enter your e-mail address. You will receive e-mail notification when new information is available in [address] 9. Click Sign Up. You can now view and download portions of your medical record. 10. Click the Download Summary menu link to download a portable copy of your medical information. If you have questions, please visit the Frequently Asked Questions section of the Winte[employer] website. Remember, [employer] is NOT to be used for urgent needs. For medical emergencies, dial 911. Now available from your iPhone and Android! Please provide this summary of care documentation to your next provider. Your primary care clinician is listed as Petr 51. If you have any questions after today's visit, please call [phone].

## 2017-03-02 NOTE — PROGRESS NOTES
This is a Subsequent Medicare Annual Wellness Visit providing Personalized Prevention Plan Services (PPPS) (Performed 12 months after initial AWV and PPPS )    I have reviewed the patient's medical history in detail and updated the computerized patient record. History     Past Medical History:   Diagnosis Date    Advance care planning 3/7/2016    Discussed with pt. She has one and will bring us a copy.  Essential hypertension 3/7/2016    Hypercholesterolemia     Hyperlipemia 1/20/2010    Hyperlipidemia 6/29/2012    Postmenopausal 1/20/2010      Past Surgical History:   Procedure Laterality Date    HX HYSTERECTOMY  1993    HX LUMBAR DISKECTOMY  1992    HX TONSILLECTOMY      OSTEOTOMY [address],<[address]    osteototomy leg open     Current Outpatient Prescriptions   Medication Sig Dispense Refill    chlorthalidone (HYGROTEN) 25 mg tablet Take 1 Tab by mouth daily. 90 Tab 1    PREMARIN 0.45 mg tablet TAKE ONE TABLET BY MOUTH EVERY DAY 90 Tab 2    atorvastatin (LIPITOR) 20 mg tablet TAKE ONE TABLET BY MOUTH EVERY DAY 90 Tab 2    metoprolol succinate (TOPROL-XL) 50 mg XL tablet Take 1 Tab by mouth daily. 90 Tab 1    Cholecalciferol, Vitamin D3, (VITAMIN D) 2,000 unit Cap Take  by mouth daily.  multivitamin (ONE A DAY) tablet Take 1 Tab by mouth daily.  pyridoxine (VITAMIN B-6) 100 mg tablet Take 100 mg by mouth daily.  aspirin 81 mg chewable tablet Take 81 mg by mouth daily.  omega-3 fatty acids-vitamin e (FISH OIL) 1,000 mg Cap Take  by mouth daily.  calcium carbonate (CALTRATE 600) 1,500 (600) mg Tab Take  by mouth two (2) times a day.  GLUC HCL/CSANA/GLY-AM-GLY,MX/C (OEEOFCGI-ABELLZEVRD-ID GLYCN-C PO) Take  by mouth daily.  pneumococcal 13 eloy conj dip (PREVNAR 13, PF,) 0.5 mL syrg injection 0.5 mL by IntraMUSCular route once for 1 dose.  0.5 mL 0     Allergies   Allergen Reactions    Ace Inhibitors Cough    Penicillin G Rash     Family History Problem Relation Age of Onset    Diabetes Mother     Hypertension Mother     Diabetes Father     Heart Disease Father      Social History   Substance Use Topics    Smoking status: Never Smoker    Smokeless tobacco: Never Used    Alcohol use 1.0 oz/week     2 Glasses of wine per week     Patient Active Problem List   Diagnosis Code    Postmenopausal Z78.0    Hyperlipidemia E78.5    Advance care planning Z71.89    Essential hypertension I10       Depression Risk Factor Screening:     PHQ 2 / 9, over the last two weeks 3/2/2017   Little interest or pleasure in doing things Not at all   Feeling down, depressed or hopeless Not at all   Total Score PHQ 2 0     Alcohol Risk Factor Screening: On any occasion during the past 3 months, have you had more than 3 drinks containing alcohol? No    Do you average more than 7 drinks per week? No      Functional Ability and Level of Safety:     Hearing Loss   none    Activities of Daily Living   Self-care. Requires assistance with: no ADLs    Fall Risk     Fall Risk Assessment, last 12 mths 3/2/2017   Able to walk? Yes   Fall in past 12 months? No     Abuse Screen   Patient is not abused    Review of Systems   Pertinent items are noted in HPI. Physical Examination     Evaluation of Cognitive Function:  Mood/affect:  happy  Appearance: age appropriate  Family member/caregiver input: none    Visit Vitals    /90 (BP 1 Location: Left arm, BP Patient Position: Sitting)    Pulse 70    Resp 20    Ht 5' 3\" (1.6 m)    Wt 161 lb (73 kg)    SpO2 96%    BMI 28.52 kg/m2     General appearance: alert, cooperative, no distress, appears stated age  Ears: normal TM's and external ear canals AU  Throat: Lips, mucosa, and tongue normal. Teeth and gums normal  Neck: supple, symmetrical, trachea midline, no adenopathy, thyroid: not enlarged, symmetric, no tenderness/mass/nodules, no carotid bruit and no JVD  Back: symmetric, no curvature.  ROM normal. No CVA tenderness. Lungs: clear to auscultation bilaterally  Heart: regular rate and rhythm, S1, S2 normal, no murmur, click, rub or gallop  Abdomen: soft, non-tender. Bowel sounds normal. No masses,  no organomegaly  Extremities: extremities normal, atraumatic, no cyanosis or edema  Pulses: 2+ and symmetric    Patient Care Team:  Silvana Navarrete MD as PCP - General (Internal Medicine)  Jean Paul Baker MD (Ophthalmology)    Advice/Referrals/Counseling   Education and counseling provided:  Are appropriate based on today's review and evaluation      Assessment/Plan       ICD-10-CM ICD-9-CM    1. Routine general medical examination at a health care facility Z00.00 V70.0    2. Screening for alcoholism Z13.89 V79.1    3. Encounter for immunization Z23 V03.89    4. Screening for diabetes mellitus Z13.1 V77.1    5. Screening for ischemic heart disease Z13.6 V81.0    6. Breast cancer screening Z12.39 V76.10 SHASHI MAMMO BI SCREENING INCL CAD   7. Osteoporosis screening Z13.820 V82.81 DEXA BONE DENSITY STUDY AXIAL   8. Menopausal symptom N95.1 627.2 DEXA BONE DENSITY STUDY AXIAL   9. Essential hypertension I10 401.9 chlorthalidone (HYGROTEN) 25 mg tablet   .

## 2017-03-27 ENCOUNTER — OFFICE VISIT (OUTPATIENT)
Dept: FAMILY MEDICINE CLINIC | Age: 72
End: 2017-03-27

## 2017-03-27 VITALS
OXYGEN SATURATION: 96 % | BODY MASS INDEX: 28.53 KG/M2 | SYSTOLIC BLOOD PRESSURE: 148 MMHG | DIASTOLIC BLOOD PRESSURE: 78 MMHG | WEIGHT: 161 LBS | TEMPERATURE: 97.5 F | RESPIRATION RATE: 16 BRPM | HEIGHT: 63 IN | HEART RATE: 71 BPM

## 2017-03-27 DIAGNOSIS — I10 ESSENTIAL HYPERTENSION: Primary | ICD-10-CM

## 2017-03-27 RX ORDER — METOPROLOL SUCCINATE 25 MG/1
25 TABLET, EXTENDED RELEASE ORAL DAILY
Qty: 90 TAB | Refills: 1 | Status: SHIPPED | OUTPATIENT
Start: 2017-03-27 | End: 2017-11-16 | Stop reason: SDUPTHER

## 2017-03-27 NOTE — PROGRESS NOTES
Assessment/Plan:    Huma Lala was seen today for follow-up. Diagnoses and all orders for this visit:    Essential hypertension  -     metoprolol succinate (TOPROL-XL) 25 mg XL tablet; Take 1 Tab by mouth daily. Will continue same med regimen. Advised her to keep monitoring her BP at home about 4 days a week so she can notify me if there are any changes. F/u Sept 2017. The plan was discussed with the patient. The patient verbalized understanding and is in agreement with the plan. All medication potential side effects were discussed with the patient.    -------------------------------------------------------------------------------------------------------------------        Ngozi Delaney is a 66 y.o. female and presents with Follow-up         Subjective:  Pt here to f/u on HTN. We adjusted med again last month. She is doing better with today's reading. Taking 1/2 tab of Toprol with chlorthalidone. Her readings at home look great, are at goal at this time. ROS:  Constitutional: No recent weight change. No weakness/fatigue. No f/c. Skin: No rashes, change in nails/hair, itching   HENT: No HA, dizziness. No hearing loss/tinnitus. No nasal congestion/discharge. Eyes: No change in vision, double/blurred vision or eye pain/redness. Cardiovascular: No CP/palpitations. No ARANA/orthopnea/PND. Respiratory: No cough/sputum, dyspnea, wheezing. Gastointestinal: No dysphagia, reflux. No n/v. No constipation/diarrhea. No melena/rectal bleeding. Genitourinary: No dysuria, urinary hesitancy, nocturia, hematuria. No incontinence. Musculoskeletal: No joint pain/stiffness. No muscle pain/tenderness. Endo: No heat/cold intolerance, no polyuria/polydypsia. Heme: No h/o anemia. No easy bleeding/bruising. Allergy/Immunology: No seasonal rhinitis. Denies frequent colds, sinus/ear infections. Neurological: No seizures/numbness/weakness. No paresthesias.    Psychiatric:  No depression, anxiety. The problem list was updated as a part of today's visit. Patient Active Problem List   Diagnosis Code    Postmenopausal Z78.0    Hyperlipidemia E78.5    Advance care planning Z71.89    Essential hypertension I10       The PSH, FH were reviewed. SH:  Social History   Substance Use Topics    Smoking status: Never Smoker    Smokeless tobacco: Never Used    Alcohol use 1.0 oz/week     2 Glasses of wine per week       Medications/Allergies:  Current Outpatient Prescriptions on File Prior to Visit   Medication Sig Dispense Refill    chlorthalidone (HYGROTEN) 25 mg tablet Take 1 Tab by mouth daily. 90 Tab 1    PREMARIN 0.45 mg tablet TAKE ONE TABLET BY MOUTH EVERY DAY 90 Tab 2    atorvastatin (LIPITOR) 20 mg tablet TAKE ONE TABLET BY MOUTH EVERY DAY 90 Tab 2    Cholecalciferol, Vitamin D3, (VITAMIN D) 2,000 unit Cap Take  by mouth daily.  multivitamin (ONE A DAY) tablet Take 1 Tab by mouth daily.  pyridoxine (VITAMIN B-6) 100 mg tablet Take 100 mg by mouth daily.  aspirin 81 mg chewable tablet Take 81 mg by mouth daily.  omega-3 fatty acids-vitamin e (FISH OIL) 1,000 mg Cap Take  by mouth daily.  calcium carbonate (CALTRATE 600) 1,500 (600) mg Tab Take  by mouth two (2) times a day.  GLUC HCL/CSANA/GLY-AM-GLY,MX/C (JHGSHNFV-TIXYXMMYSQ-QO GLYCN-C PO) Take  by mouth daily. No current facility-administered medications on file prior to visit.          Allergies   Allergen Reactions    Ace Inhibitors Cough    Penicillin G Rash         Health Maintenance:   Health Maintenance   Topic Date Due    Pneumococcal 65+ Low/Medium Risk (2 of 2 - PPSV23) 02/22/2015    DTaP/Tdap/Td series (2 - Td) 08/25/2017    GLAUCOMA SCREENING Q2Y  10/23/2017    MEDICARE YEARLY EXAM  03/03/2018    BREAST CANCER SCRN MAMMOGRAM  04/21/2018    COLONOSCOPY  08/12/2024    OSTEOPOROSIS SCREENING (DEXA)  Completed    ZOSTER VACCINE AGE 60>  Completed    INFLUENZA AGE 9 TO ADULT Completed       Objective:  Visit Vitals    /78    Pulse 71    Temp 97.5 °F (36.4 °C)    Resp 16    Ht 5' 3\" (1.6 m)    Wt 161 lb (73 kg)    SpO2 96%    BMI 28.52 kg/m2          Nurses notes and VS reviewed. Physical Examination: General appearance - alert, well appearing, and in no distress        Labwork and Ancillary Studies:    CBC w/Diff  Lab Results   Component Value Date/Time    WBC 5.8 02/23/2017 07:25 AM    HGB 13.3 02/23/2017 07:25 AM    PLATELET 075 73/68/3474 07:25 AM         Basic Metabolic Profile  Lab Results   Component Value Date/Time    Sodium 141 02/23/2017 07:25 AM    Potassium 4.2 02/23/2017 07:25 AM    Chloride 105 02/23/2017 07:25 AM    CO2 29 02/23/2017 07:25 AM    Anion gap 7 02/23/2017 07:25 AM    Glucose 87 02/23/2017 07:25 AM    BUN 17 02/23/2017 07:25 AM    Creatinine 0.67 02/23/2017 07:25 AM    BUN/Creatinine ratio 25 02/23/2017 07:25 AM    GFR est AA >60 02/23/2017 07:25 AM    GFR est non-AA >60 02/23/2017 07:25 AM    Calcium 8.8 02/23/2017 07:25 AM         LFT  Lab Results   Component Value Date/Time    ALT (SGPT) 37 02/23/2017 07:25 AM    AST (SGOT) 24 02/23/2017 07:25 AM    Alk.  phosphatase 68 02/23/2017 07:25 AM    Bilirubin, direct 0.1 02/23/2017 07:25 AM    Bilirubin, total 0.3 02/23/2017 07:25 AM         Cholesterol  Lab Results   Component Value Date/Time    Cholesterol, total 190 02/23/2017 07:25 AM    HDL Cholesterol 97 02/23/2017 07:25 AM    LDL, calculated 66 02/23/2017 07:25 AM    Triglyceride 135 02/23/2017 07:25 AM    CHOL/HDL Ratio 2.0 02/23/2017 07:25 AM

## 2017-03-27 NOTE — PROGRESS NOTES
Gio Brian is a 66 y.o. female here today for HTN follow-up. 1. Have you been to the ER, urgent care clinic since your last visit? Hospitalized since your last visit? No    2. Have you seen or consulted any other health care providers outside of the [facility] since your last visit? Include any pap smears or colon screening.  No

## 2017-03-27 NOTE — PATIENT INSTRUCTIONS

## 2017-05-01 DIAGNOSIS — Z78.0 POSTMENOPAUSAL: ICD-10-CM

## 2017-05-02 ENCOUNTER — TELEPHONE (OUTPATIENT)
Dept: FAMILY MEDICINE CLINIC | Age: 72
End: 2017-05-02

## 2017-05-02 NOTE — TELEPHONE ENCOUNTER
Calling to check status of prior auth for Premarin. Advised I would send message back. They asked if they should refax over, provided both numbers and advised to refax.

## 2017-05-02 NOTE — TELEPHONE ENCOUNTER
Pt requesting RX refill(s) for:  Requested Prescriptions     Pending Prescriptions Disp Refills    estrogens, conjugated, (PREMARIN) 0.45 mg tablet 90 Tab 2     Last refill: 01/26/17    Last visit: 03/27/17      Thank you    Gabrielle Cardenas LPN

## 2017-05-02 NOTE — TELEPHONE ENCOUNTER
Pt scheduled for Ultrasound, they realized she will also be due for her annual diagnostic mammogram.  Trying to see if we can either get order or verbal for ok to do mammogram.     Fax: [phone]

## 2017-05-03 ENCOUNTER — TELEPHONE (OUTPATIENT)
Dept: FAMILY MEDICINE CLINIC | Age: 72
End: 2017-05-03

## 2017-05-03 NOTE — TELEPHONE ENCOUNTER
There is already an order for DEXA in her chart from March. As far as the mammo, I received an order to sign from [facility] which I already signed. Ashley faxed it back.

## 2017-05-03 NOTE — TELEPHONE ENCOUNTER
Zoe from [facility] called stating the pt's order for a mammogram needs to be updated to \"Diagnostic Bilateral Mammo\" as it is a yearly exam. She also stated the pt is scheduled for a Bone Density scan on 5/8 and they will need an order for that also.     Orders can be faxed to [phone] Attn: Zoe

## 2017-05-23 DIAGNOSIS — Z12.39 BREAST CANCER SCREENING: ICD-10-CM

## 2017-05-26 DIAGNOSIS — Z13.820 OSTEOPOROSIS SCREENING: ICD-10-CM

## 2017-05-26 DIAGNOSIS — N95.1 MENOPAUSAL SYMPTOM: ICD-10-CM

## 2017-08-21 DIAGNOSIS — I10 ESSENTIAL HYPERTENSION: ICD-10-CM

## 2017-08-22 RX ORDER — CHLORTHALIDONE 25 MG/1
TABLET ORAL
Qty: 90 TAB | Refills: 1 | Status: SHIPPED | OUTPATIENT
Start: 2017-08-22 | End: 2018-02-19 | Stop reason: SDUPTHER

## 2017-09-21 ENCOUNTER — OFFICE VISIT (OUTPATIENT)
Dept: FAMILY MEDICINE CLINIC | Age: 72
End: 2017-09-21

## 2017-09-21 VITALS
HEART RATE: 73 BPM | BODY MASS INDEX: 28.84 KG/M2 | SYSTOLIC BLOOD PRESSURE: 141 MMHG | RESPIRATION RATE: 18 BRPM | HEIGHT: 63 IN | WEIGHT: 162.8 LBS | OXYGEN SATURATION: 97 % | DIASTOLIC BLOOD PRESSURE: 76 MMHG | TEMPERATURE: 97.6 F

## 2017-09-21 DIAGNOSIS — E55.9 HYPOVITAMINOSIS D: ICD-10-CM

## 2017-09-21 DIAGNOSIS — I10 ESSENTIAL HYPERTENSION: ICD-10-CM

## 2017-09-21 DIAGNOSIS — E78.00 PURE HYPERCHOLESTEROLEMIA: Primary | ICD-10-CM

## 2017-09-21 DIAGNOSIS — N64.9 BREAST LESION: ICD-10-CM

## 2017-09-21 NOTE — PROGRESS NOTES
Chief Complaint   Patient presents with    Hypertension    Cholesterol Problem     1. Have you been to the ER, urgent care clinic since your last visit? Hospitalized since your last visit? No    2. Have you seen or consulted any other health care providers outside of [facility] since your last visit? Include any pap smears or colon screening.  No

## 2017-09-21 NOTE — MR AVS SNAPSHOT
Visit Information Date & Time Provider Department Dept. Phone Encounter #  
 9/21/2017  8:30 AM Radha Dumas [facility] [phone] [id number] Upcoming Health Maintenance Date Due Pneumococcal 65+ Low/Medium Risk (2 of 2 - PPSV23) 2/22/2015 GLAUCOMA SCREENING Q2Y 10/23/2017 MEDICARE YEARLY EXAM 3/3/2018 BREAST CANCER SCRN MAMMOGRAM 5/8/2019 COLONOSCOPY 8/12/2024 DTaP/Tdap/Td series (3 - Td) 9/21/2027 Allergies as of 9/21/2017  Review Complete On: 9/21/2017 By: Mi Montiel MD  
  
 Severity Noted Reaction Type Reactions Ace Inhibitors  03/02/2017    Cough Penicillin G  01/20/2010   Side Effect Rash Current Immunizations  Reviewed on 3/2/2017 Name Date Influenza High Dose Vaccine PF 9/12/2016, 9/24/2015, 9/25/2013, 9/25/2013 Influenza Vaccine 9/15/2014 Influenza Vaccine Whole 9/20/2012 Tdap 8/25/2007 ZZZ-RETIRED (DO NOT USE) Pneumococcal Vaccine (Unspecified Type) 2/22/2010 Zoster Vaccine, Live 5/13/2010 Not reviewed this visit You Were Diagnosed With   
  
 Codes Comments Breast lesion    -  Primary ICD-10-CM: N64.9 ICD-9-CM: 611.9 Vitals BP Pulse Temp Resp Height(growth percentile) Weight(growth percentile) 141/76 (BP 1 Location: Right arm, BP Patient Position: Sitting) 73 97.6 °F (36.4 °C) (Oral) 18 5' 3\" (1.6 m) 162 lb 12.8 oz (73.8 kg) SpO2 BMI OB Status Smoking Status 97% 28.84 kg/m2 Hysterectomy Never Smoker Vitals History BMI and BSA Data Body Mass Index Body Surface Area  
 28.84 kg/m 2 1.81 m 2 Preferred Pharmacy Pharmacy Name Phone ON [address], [address] [facility] [address] [place] Your Updated Medication List  
  
   
This list is accurate as of: 9/21/17  9:02 AM.  Always use your most recent med list.  
  
  
  
  
 aspirin 81 mg chewable tablet Take 81 mg by mouth daily. atorvastatin 20 mg tablet Commonly known as:  LIPITOR  
TAKE ONE TABLET BY MOUTH EVERY DAY  
  
 CALTRATE 600 600 mg calcium (1,500 mg) tablet Take  by mouth two (2) times a day. chlorthalidone 25 mg tablet Commonly known as:  HYGROTEN  
TAKE ONE TABLET BY MOUTH EVERY DAY  
  
 estrogens (conjugated) 0.45 mg tablet Commonly known as:  PREMARIN  
TAKE ONE TABLET BY MOUTH EVERY DAY  
  
 FISH OIL 1,000 mg Cap Generic drug:  omega-3 fatty acids-vitamin e Take  by mouth daily. [id number]-CGKDCVERCY-BU GLYCN-C PO Take  by mouth daily. metoprolol succinate 25 mg XL tablet Commonly known as:  TOPROL-XL Take 1 Tab by mouth daily. multivitamin tablet Commonly known as:  ONE A DAY Take 1 Tab by mouth daily. VITAMIN B-6 100 mg tablet Generic drug:  pyridoxine (vitamin B6) Take 100 mg by mouth daily. VITAMIN D 2,000 unit Cap capsule Generic drug:  Cholecalciferol (Vitamin D3) Take  by mouth daily. To-Do List   
 09/21/2017 Imaging:  SHASHI MAMMO BI DX INCL CAD   
  
 09/21/2017 Imaging:  US BREAST RT LIMITED=<3 QUAD Patient Instructions High Blood Pressure: Care Instructions Your Care Instructions If your blood pressure is usually above 140/90, you have high blood pressure, or hypertension. That means the top number is 140 or higher or the bottom number is 90 or higher, or both. Despite what a lot of people think, high blood pressure usually doesn't cause headaches or make you feel dizzy or lightheaded. It usually has no symptoms. But it does increase your risk for heart attack, stroke, and kidney or eye damage. The higher your blood pressure, the more your risk increases. Your doctor will give you a goal for your blood pressure. Your goal will be based on your health and your age. An example of a goal is to keep your blood pressure below 140/90.  
Lifestyle changes, such as eating healthy and being active, are always important to help lower blood pressure. You might also take medicine to reach your blood pressure goal. 
Follow-up care is a key part of your treatment and safety. Be sure to make and go to all appointments, and call your doctor if you are having problems. It's also a good idea to know your test results and keep a list of the medicines you take. How can you care for yourself at home? Medical treatment · If you stop taking your medicine, your blood pressure will go back up. You may take one or more types of medicine to lower your blood pressure. Be safe with medicines. Take your medicine exactly as prescribed. Call your doctor if you think you are having a problem with your medicine. · Talk to your doctor before you start taking aspirin every day. Aspirin can help certain people lower their risk of a heart attack or stroke. But taking aspirin isn't right for everyone, because it can cause serious bleeding. · See your doctor regularly. You may need to see the doctor more often at first or until your blood pressure comes down. · If you are taking blood pressure medicine, talk to your doctor before you take decongestants or anti-inflammatory medicine, such as ibuprofen. Some of these medicines can raise blood pressure. · Learn how to check your blood pressure at home. Lifestyle changes · Stay at a healthy weight. This is especially important if you put on weight around the waist. Losing even 10 pounds can help you lower your blood pressure. · If your doctor recommends it, get more exercise. Walking is a good choice. Bit by bit, increase the amount you walk every day. Try for at least 30 minutes on most days of the week. You also may want to swim, bike, or do other activities. · Avoid or limit alcohol. Talk to your doctor about whether you can drink any alcohol. · Try to limit how much sodium you eat to less than 2,300 milligrams (mg) a day. Your doctor may ask you to try to eat less than 1,500 mg a day. · Eat plenty of fruits (such as bananas and oranges), vegetables, legumes, whole grains, and low-fat dairy products. · Lower the amount of saturated fat in your diet. Saturated fat is found in animal products such as milk, cheese, and meat. Limiting these foods may help you lose weight and also lower your risk for heart disease. · Do not smoke. Smoking increases your risk for heart attack and stroke. If you need help quitting, talk to your doctor about stop-smoking programs and medicines. These can increase your chances of quitting for good. When should you call for help? Call 911 anytime you think you may need emergency care. This may mean having symptoms that suggest that your blood pressure is causing a serious heart or blood vessel problem. Your blood pressure may be over 180/110. For example, call 911 if: 
· You have symptoms of a heart attack. These may include: ¨ Chest pain or pressure, or a strange feeling in the chest. 
¨ Sweating. ¨ Shortness of breath. ¨ Nausea or vomiting. ¨ Pain, pressure, or a strange feeling in the back, neck, jaw, or upper belly or in one or both shoulders or arms. ¨ Lightheadedness or sudden weakness. ¨ A fast or irregular heartbeat. · You have symptoms of a stroke. These may include: 
¨ Sudden numbness, tingling, weakness, or loss of movement in your face, arm, or leg, especially on only one side of your body. ¨ Sudden vision changes. ¨ Sudden trouble speaking. ¨ Sudden confusion or trouble understanding simple statements. ¨ Sudden problems with walking or balance. ¨ A sudden, severe headache that is different from past headaches. · You have severe back or belly pain. Do not wait until your blood pressure comes down on its own. Get help right away. Call your doctor now or seek immediate care if: 
· Your blood pressure is much higher than normal (such as 180/110 or higher), but you don't have symptoms. · You think high blood pressure is causing symptoms, such as: ¨ Severe headache. ¨ Blurry vision. Watch closely for changes in your health, and be sure to contact your doctor if: 
· Your blood pressure measures 140/90 or higher at least 2 times. That means the top number is 140 or higher or the bottom number is 90 or higher, or both. · You think you may be having side effects from your blood pressure medicine. · Your blood pressure is usually normal, but it goes above normal at least 2 times. Where can you learn more? Go to http://aline-lakhwinder.info/. Enter Z426 in the search box to learn more about \"High Blood Pressure: Care Instructions. \" Current as of: August 8, 2016 Content Version: 11.3 © 9002-7207 [employer]. Care instructions adapted under license by [employer] (which disclaims liability or warranty for this information). If you have questions about a medical condition or this instruction, always ask your healthcare professional. [place] [address] any warranty or liability for your use of this information. Introducing \[facility]\"" & HEALTH SERVICES! Brannon Del Rosario introduces [employer] patient portal. Now you can access parts of your medical record, email your doctor's office, and request medication refills online. 1. In your internet browser, go to [email or web address]. [email or web address]/Beat My Waste Quote 2. Click on the First Time User? Click Here link in the Sign In box. You will see the New Member Sign Up page. 3. Enter your [employer] Access Code exactly as it appears below. You will not need to use this code after youve completed the sign-up process. If you do not sign up before the expiration date, you must request a new code. · [employer] Access Code: [email or web address] Expires: 12/20/2017  9:02 AM 
 
4. Enter the last four digits of your Social Security Number (xxxx) and Date of Birth (mm/dd/yyyy) as indicated and click Submit.  You will be taken to the next sign-up page. 5. Create a iOculi ID. This will be your iOculi login ID and cannot be changed, so think of one that is secure and easy to remember. 6. Create a iOculi password. You can change your password at any time. 7. Enter your Password Reset Question and Answer. This can be used at a later time if you forget your password. 8. Enter your e-mail address. You will receive e-mail notification when new information is available in [address]. 9. Click Sign Up. You can now view and download portions of your medical record. 10. Click the Download Summary menu link to download a portable copy of your medical information. If you have questions, please visit the Frequently Asked Questions section of the iO[employer]i website. Remember, iO[employer]i is NOT to be used for urgent needs. For medical emergencies, dial 911. Now available from your iPhone and Android! Please provide this summary of care documentation to your next provider. Your primary care clinician is listed as Petr 51. If you have any questions after today's visit, please call [phone].

## 2017-09-21 NOTE — PROGRESS NOTES
Assessment/Plan:    *Diagnoses and all orders for this visit:    1. Pure hypercholesterolemia  -     CBC WITH AUTOMATED DIFF; Future  -     HEPATIC FUNCTION PANEL; Future  -     LIPID PANEL; Future  -     METABOLIC PANEL, BASIC; Future  -     TSH 3RD GENERATION; Future  -     T4, FREE; Future  -     URINALYSIS W/ RFLX MICROSCOPIC; Future    2. Essential hypertension    3. Breast lesion  -     SHASHI MAMMO BI DX INCL CAD; Future  -     US BREAST RT LIMITED=<3 QUAD; Future    4. Hypovitaminosis D  -     VITAMIN D, 25 HYDROXY; Future      Physical in March 2018. BW. The plan was discussed with the patient. The patient verbalized understanding and is in agreement with the plan. All medication potential side effects were discussed with the patient.    -------------------------------------------------------------------------------------------------------------------        Adela So is a 67 y.o. female and presents with Hypertension and Cholesterol Problem         Subjective:  Pt here for f/u. Has been doing well. HTN: well controlled here and at home. We reviewed her home BP log. Readings are lower at home. HLD: controlled. Due for 6 mon repeat diagnostic B/L mammogram and RT US.    ROS:  Constitutional: No recent weight change. No weakness/fatigue. No f/c. Skin: No rashes, change in nails/hair, itching   HENT: No HA, dizziness. No hearing loss/tinnitus. No nasal congestion/discharge. Eyes: No change in vision, double/blurred vision or eye pain/redness. Cardiovascular: No CP/palpitations. No ARANA/orthopnea/PND. Respiratory: No cough/sputum, dyspnea, wheezing. Gastointestinal: No dysphagia, reflux. No n/v. No constipation/diarrhea. No melena/rectal bleeding. Genitourinary: No dysuria, urinary hesitancy, nocturia, hematuria. No incontinence. Musculoskeletal: No joint pain/stiffness. No muscle pain/tenderness. Endo: No heat/cold intolerance, no polyuria/polydypsia.    Heme: No h/o anemia. No easy bleeding/bruising. Allergy/Immunology: No seasonal rhinitis. Denies frequent colds, sinus/ear infections. Neurological: No seizures/numbness/weakness. No paresthesias. Psychiatric:  No depression, anxiety. The problem list was updated as a part of today's visit. Patient Active Problem List   Diagnosis Code    Postmenopausal Z78.0    Hyperlipidemia E78.5    Advance care planning Z71.89    Essential hypertension I10       The PSH,  were reviewed. SH:  Social History   Substance Use Topics    Smoking status: Never Smoker    Smokeless tobacco: Never Used    Alcohol use 1.0 oz/week     2 Glasses of wine per week       Medications/Allergies:  Current Outpatient Prescriptions on File Prior to Visit   Medication Sig Dispense Refill    chlorthalidone (HYGROTEN) 25 mg tablet TAKE ONE TABLET BY MOUTH EVERY DAY 90 Tab 1    estrogens, conjugated, (PREMARIN) 0.45 mg tablet TAKE ONE TABLET BY MOUTH EVERY DAY 90 Tab 1    metoprolol succinate (TOPROL-XL) 25 mg XL tablet Take 1 Tab by mouth daily. 90 Tab 1    atorvastatin (LIPITOR) 20 mg tablet TAKE ONE TABLET BY MOUTH EVERY DAY 90 Tab 2    Cholecalciferol, Vitamin D3, (VITAMIN D) 2,000 unit Cap Take  by mouth daily.  multivitamin (ONE A DAY) tablet Take 1 Tab by mouth daily.  pyridoxine (VITAMIN B-6) 100 mg tablet Take 100 mg by mouth daily.  aspirin 81 mg chewable tablet Take 81 mg by mouth daily.  omega-3 fatty acids-vitamin e (FISH OIL) 1,000 mg Cap Take  by mouth daily.  calcium carbonate (CALTRATE 600) 1,500 (600) mg Tab Take  by mouth two (2) times a day.  GLUC HCL/CSANA/GLY-AM-GLY,MX/C ([id number]-QB GLYCN-C PO) Take  by mouth daily. No current facility-administered medications on file prior to visit.          Allergies   Allergen Reactions    Ace Inhibitors Cough    Penicillin G Rash         Health Maintenance:   Health Maintenance   Topic Date Due    Pneumococcal 65+ Low/Medium Risk (2 of 2 - PPSV23) 02/22/2015    GLAUCOMA SCREENING Q2Y  10/23/2017    MEDICARE YEARLY EXAM  03/03/2018    BREAST CANCER SCRN MAMMOGRAM  05/08/2019    COLONOSCOPY  08/12/2024    DTaP/Tdap/Td series (3 - Td) 09/21/2027    OSTEOPOROSIS SCREENING (DEXA)  Completed    ZOSTER VACCINE AGE 60>  Completed    INFLUENZA AGE 9 TO ADULT  Completed       Objective:  Visit Vitals    /76 (BP 1 Location: Right arm, BP Patient Position: Sitting)    Pulse 73    Temp 97.6 °F (36.4 °C) (Oral)    Resp 18    Ht 5' 3\" (1.6 m)    Wt 162 lb 12.8 oz (73.8 kg)    SpO2 97%    BMI 28.84 kg/m2          Nurses notes and VS reviewed. Physical Examination: General appearance - alert, well appearing, and in no distress  Chest - clear to auscultation, no wheezes, rales or rhonchi, symmetric air entry  Heart - normal rate, regular rhythm, normal S1, S2, no murmurs, rubs, clicks or gallops  Ext - no edema      Labwork and Ancillary Studies:    CBC w/Diff  Lab Results   Component Value Date/Time    WBC 5.8 02/23/2017 07:25 AM    HGB 13.3 02/23/2017 07:25 AM    PLATELET 908 16/57/1809 07:25 AM         Basic Metabolic Profile  Lab Results   Component Value Date/Time    Sodium 141 02/23/2017 07:25 AM    Potassium 4.2 02/23/2017 07:25 AM    Chloride 105 02/23/2017 07:25 AM    CO2 29 02/23/2017 07:25 AM    Anion gap 7 02/23/2017 07:25 AM    Glucose 87 02/23/2017 07:25 AM    BUN 17 02/23/2017 07:25 AM    Creatinine 0.67 02/23/2017 07:25 AM    BUN/Creatinine ratio 25 02/23/2017 07:25 AM    GFR est AA >60 02/23/2017 07:25 AM    GFR est non-AA >60 02/23/2017 07:25 AM    Calcium 8.8 02/23/2017 07:25 AM         LFT  Lab Results   Component Value Date/Time    ALT (SGPT) 37 02/23/2017 07:25 AM    AST (SGOT) 24 02/23/2017 07:25 AM    Alk.  phosphatase 68 02/23/2017 07:25 AM    Bilirubin, direct 0.1 02/23/2017 07:25 AM    Bilirubin, total 0.3 02/23/2017 07:25 AM         Cholesterol  Lab Results   Component Value Date/Time    Cholesterol, total 190 02/23/2017 07:25 AM    HDL Cholesterol 97 02/23/2017 07:25 AM    LDL, calculated 66 02/23/2017 07:25 AM    Triglyceride 135 02/23/2017 07:25 AM    CHOL/HDL Ratio 2.0 02/23/2017 07:25 AM

## 2017-09-21 NOTE — PATIENT INSTRUCTIONS

## 2017-11-16 DIAGNOSIS — I10 ESSENTIAL HYPERTENSION: ICD-10-CM

## 2017-11-17 RX ORDER — METOPROLOL SUCCINATE 25 MG/1
TABLET, EXTENDED RELEASE ORAL
Qty: 90 TAB | Refills: 1 | Status: SHIPPED | OUTPATIENT
Start: 2017-11-17 | End: 2018-03-15 | Stop reason: DRUGHIGH

## 2017-11-30 DIAGNOSIS — N64.9 BREAST LESION: ICD-10-CM

## 2018-03-08 ENCOUNTER — HOSPITAL ENCOUNTER (OUTPATIENT)
Dept: LAB | Age: 73
Discharge: HOME | End: 2018-03-08
Payer: MEDICARE

## 2018-03-08 DIAGNOSIS — E78.00 PURE HYPERCHOLESTEROLEMIA: ICD-10-CM

## 2018-03-08 LAB
ALBUMIN SERPL-MCNC: 3.6 G/DL (ref 3.4–5)
ALBUMIN/GLOB SERPL: 1.1 {RATIO} (ref 0.8–1.7)
ALP SERPL-CCNC: 66 U/L (ref 45–117)
ALT SERPL-CCNC: 35 U/L (ref 13–56)
ANION GAP SERPL CALC-SCNC: 7 MMOL/L (ref 3–18)
APPEARANCE UR: CLEAR
AST SERPL-CCNC: 27 U/L (ref 15–37)
BACTERIA URNS QL MICRO: ABNORMAL /HPF
BASOPHILS # BLD: 0 K/UL (ref 0–0.06)
BASOPHILS NFR BLD: 1 % (ref 0–2)
BILIRUB DIRECT SERPL-MCNC: <0.1 MG/DL (ref 0–0.2)
BILIRUB SERPL-MCNC: 0.3 MG/DL (ref 0.2–1)
BILIRUB UR QL: NEGATIVE
BUN SERPL-MCNC: 17 MG/DL (ref 7–18)
BUN/CREAT SERPL: 27 (ref 12–20)
CALCIUM SERPL-MCNC: 9.3 MG/DL (ref 8.5–10.1)
CHLORIDE SERPL-SCNC: 103 MMOL/L (ref 100–108)
CHOLEST SERPL-MCNC: 199 MG/DL
CO2 SERPL-SCNC: 30 MMOL/L (ref 21–32)
COLOR UR: YELLOW
CREAT SERPL-MCNC: 0.63 MG/DL (ref 0.6–1.3)
DIFFERENTIAL METHOD BLD: NORMAL
EOSINOPHIL # BLD: 0.2 K/UL (ref 0–0.4)
EOSINOPHIL NFR BLD: 3 % (ref 0–5)
EPITH CASTS URNS QL MICRO: ABNORMAL /LPF (ref 0–5)
ERYTHROCYTE [DISTWIDTH] IN BLOOD BY AUTOMATED COUNT: 13.6 % (ref 11.6–14.5)
GLOBULIN SER CALC-MCNC: 3.2 G/DL (ref 2–4)
GLUCOSE SERPL-MCNC: 99 MG/DL (ref 74–99)
GLUCOSE UR STRIP.AUTO-MCNC: NEGATIVE MG/DL
HCT VFR BLD AUTO: 41.6 % (ref 35–45)
HDLC SERPL-MCNC: 101 MG/DL (ref 40–60)
HDLC SERPL: 2 {RATIO} (ref 0–5)
HGB BLD-MCNC: 13.3 G/DL (ref 12–16)
HGB UR QL STRIP: NEGATIVE
KETONES UR QL STRIP.AUTO: NEGATIVE MG/DL
LDLC SERPL CALC-MCNC: 65.2 MG/DL (ref 0–100)
LEUKOCYTE ESTERASE UR QL STRIP.AUTO: ABNORMAL
LIPID PROFILE,FLP: ABNORMAL
LYMPHOCYTES # BLD: 2 K/UL (ref 0.9–3.6)
LYMPHOCYTES NFR BLD: 33 % (ref 21–52)
MCH RBC QN AUTO: 29.6 PG (ref 24–34)
MCHC RBC AUTO-ENTMCNC: 32 G/DL (ref 31–37)
MCV RBC AUTO: 92.4 FL (ref 74–97)
MONOCYTES # BLD: 0.5 K/UL (ref 0.05–1.2)
MONOCYTES NFR BLD: 9 % (ref 3–10)
NEUTS SEG # BLD: 3.4 K/UL (ref 1.8–8)
NEUTS SEG NFR BLD: 54 % (ref 40–73)
NITRITE UR QL STRIP.AUTO: NEGATIVE
PH UR STRIP: 6.5 [PH] (ref 5–8)
PLATELET # BLD AUTO: 229 K/UL (ref 135–420)
PMV BLD AUTO: 11.3 FL (ref 9.2–11.8)
POTASSIUM SERPL-SCNC: 4.2 MMOL/L (ref 3.5–5.5)
PROT SERPL-MCNC: 6.8 G/DL (ref 6.4–8.2)
PROT UR STRIP-MCNC: NEGATIVE MG/DL
RBC # BLD AUTO: 4.5 M/UL (ref 4.2–5.3)
RBC #/AREA URNS HPF: 0 /HPF (ref 0–5)
SODIUM SERPL-SCNC: 140 MMOL/L (ref 136–145)
SP GR UR REFRACTOMETRY: 1.02 (ref 1–1.03)
T4 FREE SERPL-MCNC: 1.1 NG/DL (ref 0.7–1.5)
TRIGL SERPL-MCNC: 164 MG/DL (ref ?–150)
TSH SERPL DL<=0.05 MIU/L-ACNC: 2.5 UIU/ML (ref 0.36–3.74)
UROBILINOGEN UR QL STRIP.AUTO: 0.2 EU/DL (ref 0.2–1)
VLDLC SERPL CALC-MCNC: 32.8 MG/DL
WBC # BLD AUTO: 6.2 K/UL (ref 4.6–13.2)
WBC URNS QL MICRO: ABNORMAL /HPF (ref 0–4)

## 2018-03-08 PROCEDURE — 84439 ASSAY OF FREE THYROXINE: CPT | Performed by: INTERNAL MEDICINE

## 2018-03-08 PROCEDURE — 80048 BASIC METABOLIC PNL TOTAL CA: CPT | Performed by: INTERNAL MEDICINE

## 2018-03-08 PROCEDURE — 36415 COLL VENOUS BLD VENIPUNCTURE: CPT | Performed by: INTERNAL MEDICINE

## 2018-03-08 PROCEDURE — 85025 COMPLETE CBC W/AUTO DIFF WBC: CPT | Performed by: INTERNAL MEDICINE

## 2018-03-08 PROCEDURE — 80061 LIPID PANEL: CPT | Performed by: INTERNAL MEDICINE

## 2018-03-08 PROCEDURE — 80076 HEPATIC FUNCTION PANEL: CPT | Performed by: INTERNAL MEDICINE

## 2018-03-08 PROCEDURE — 84443 ASSAY THYROID STIM HORMONE: CPT | Performed by: INTERNAL MEDICINE

## 2018-03-08 PROCEDURE — 81001 URINALYSIS AUTO W/SCOPE: CPT | Performed by: INTERNAL MEDICINE

## 2018-03-15 ENCOUNTER — OFFICE VISIT (OUTPATIENT)
Dept: FAMILY MEDICINE CLINIC | Age: 73
End: 2018-03-15

## 2018-03-15 VITALS
BODY MASS INDEX: 29.23 KG/M2 | TEMPERATURE: 98.1 F | HEART RATE: 75 BPM | RESPIRATION RATE: 18 BRPM | DIASTOLIC BLOOD PRESSURE: 86 MMHG | OXYGEN SATURATION: 98 % | WEIGHT: 165 LBS | SYSTOLIC BLOOD PRESSURE: 142 MMHG | HEIGHT: 63 IN

## 2018-03-15 DIAGNOSIS — I10 ESSENTIAL HYPERTENSION: Primary | ICD-10-CM

## 2018-03-15 DIAGNOSIS — Z00.00 MEDICARE ANNUAL WELLNESS VISIT, SUBSEQUENT: ICD-10-CM

## 2018-03-15 DIAGNOSIS — Z13.39 SCREENING FOR ALCOHOLISM: ICD-10-CM

## 2018-03-15 DIAGNOSIS — E78.00 PURE HYPERCHOLESTEROLEMIA: ICD-10-CM

## 2018-03-15 DIAGNOSIS — Z13.1 SCREENING FOR DIABETES MELLITUS: ICD-10-CM

## 2018-03-15 DIAGNOSIS — Z13.6 SCREENING FOR ISCHEMIC HEART DISEASE: ICD-10-CM

## 2018-03-15 RX ORDER — METOPROLOL SUCCINATE 50 MG/1
TABLET, EXTENDED RELEASE ORAL DAILY
COMMUNITY
End: 2018-05-11 | Stop reason: SDUPTHER

## 2018-03-15 NOTE — PROGRESS NOTES
This is the Subsequent Medicare Annual Wellness Exam, performed 12 months or more after the Initial AWV or the last Subsequent AWV    I have reviewed the patient's medical history in detail and updated the computerized patient record. History     Past Medical History:   Diagnosis Date    Advance care planning 3/7/2016    Discussed with pt. She has one and will bring us a copy.  Essential hypertension 3/7/2016    Hypercholesterolemia     Hyperlipemia 1/20/2010    Hyperlipidemia 6/29/2012    Postmenopausal 1/20/2010      Past Surgical History:   Procedure Laterality Date    HX HYSTERECTOMY  1993    HX LUMBAR DISKECTOMY  1992    HX TONSILLECTOMY      OSTEOTOMY [address],<[address]    osteototomy leg open     Current Outpatient Prescriptions   Medication Sig Dispense Refill    metoprolol succinate (TOPROL XL) 50 mg XL tablet Take  by mouth daily.  pneumococcal 13 eloy conj dip (PREVNAR 13, PF,) 0.5 mL syrg injection 0.5 mL by IntraMUSCular route once for 1 dose. 0.5 mL 0    chlorthalidone (HYGROTEN) 25 mg tablet TAKE 1 TABLET BY MOUTH ONCE DAILY 90 Tab 0    atorvastatin (LIPITOR) 20 mg tablet TAKE ONE TABLET BY MOUTH EVERY DAY 90 Tab 1    estrogens, conjugated, (PREMARIN) 0.45 mg tablet TAKE ONE TABLET BY MOUTH EVERY DAY 90 Tab 1    Cholecalciferol, Vitamin D3, (VITAMIN D) 2,000 unit Cap Take  by mouth daily.  multivitamin (ONE A DAY) tablet Take 1 Tab by mouth daily.  pyridoxine (VITAMIN B-6) 100 mg tablet Take 100 mg by mouth daily.  aspirin 81 mg chewable tablet Take 81 mg by mouth daily.  omega-3 fatty acids-vitamin e (FISH OIL) 1,000 mg Cap Take  by mouth daily.  calcium carbonate (CALTRATE 600) 1,500 (600) mg Tab Take  by mouth two (2) times a day.  GLUC HCL/CSANA/GLY-AM-GLY,MX/C ([id number]-BP GLYCN-C PO) Take  by mouth daily.        Allergies   Allergen Reactions    Ace Inhibitors Cough    Penicillin G Rash     Family History   Problem Relation Age of Onset    Diabetes Mother     Hypertension Mother     Diabetes Father     Heart Disease Father      Social History   Substance Use Topics    Smoking status: Never Smoker    Smokeless tobacco: Never Used    Alcohol use 1.0 oz/week     2 Glasses of wine per week     Patient Active Problem List   Diagnosis Code    Postmenopausal Z78.0    Hyperlipidemia E78.5    Advance care planning Z71.89    Essential hypertension I10       Depression Risk Factor Screening:     PHQ over the last two weeks 3/15/2018   Little interest or pleasure in doing things Not at all   Feeling down, depressed or hopeless Not at all   Total Score PHQ 2 0     Alcohol Risk Factor Screening: You do not drink alcohol or very rarely. Functional Ability and Level of Safety:   Hearing Loss  Hearing is good. Activities of Daily Living  The home contains: no safety equipment. Patient does total self care    Fall Risk  Fall Risk Assessment, last 12 mths 3/15/2018   Able to walk? Yes   Fall in past 12 months? Yes   Fall with injury? No   Number of falls in past 12 months 1   Fall Risk Score 1       Abuse Screen  Patient is not abused    Cognitive Screening   Evaluation of Cognitive Function:  Has your family/caregiver stated any concerns about your memory: no  Normal    Patient Care Team   Patient Care Team:  Marisa Mock MD as PCP - General (Internal Medicine)  Otto Rawls MD (Ophthalmology)    Assessment/Plan   Education and counseling provided:  Are appropriate based on today's review and evaluation    Diagnoses and all orders for this visit:    1. Medicare annual wellness visit, subsequent    2. Essential hypertension    3. Screening for alcoholism  -     Annual  Alcohol Screen 15 min ()    4. Screening for diabetes mellitus    5. Screening for ischemic heart disease    6.  Pure hypercholesterolemia    Other orders  -     pneumococcal 13 eloy conj dip (PREVNAR 13, PF,) 0.5 mL syrg injection; 0.5 mL by IntraMUSCular route once for 1 dose.         Health Maintenance Due   Topic Date Due    Pneumococcal 65+ Low/Medium Risk (2 of 2 - PPSV23) 02/22/2015    GLAUCOMA SCREENING Q2Y  10/23/2017    MEDICARE YEARLY EXAM  03/03/2018

## 2018-03-15 NOTE — MR AVS SNAPSHOT
[address] [place] [address] Maggie Garcia [address] [facility] [address] 
[phone] Patient: Bryce Lopez MRN: [id number] AYK:[id number]3 Visit Information Date & Time Provider Department Dept. Phone Encounter #  
 3/15/2018  8:00 AM Micheal BandarRadha [phone] [id number] Follow-up Instructions Return in about 4 weeks (around 4/12/2018), or if symptoms worsen or fail to improve. Upcoming Health Maintenance Date Due Pneumococcal 65+ Low/Medium Risk (2 of 2 - PPSV23) 2/22/2015 GLAUCOMA SCREENING Q2Y 10/23/2017 MEDICARE YEARLY EXAM 3/3/2018 BREAST CANCER SCRN MAMMOGRAM 2/9/2019 COLONOSCOPY 8/12/2024 DTaP/Tdap/Td series (3 - Td) 9/21/2027 Allergies as of 3/15/2018  Review Complete On: 3/15/2018 By: Micheal Portillo MD  
  
 Severity Noted Reaction Type Reactions Ace Inhibitors  03/02/2017    Cough Penicillin G  01/20/2010   Side Effect Rash Current Immunizations  Reviewed on 3/15/2018 Name Date Influenza High Dose Vaccine PF 9/12/2016, 9/24/2015, 9/25/2013, 9/25/2013 Influenza Vaccine 9/15/2014 Influenza Vaccine Whole 9/20/2012 Tdap 8/25/2007 ZZZ-RETIRED (DO NOT USE) Pneumococcal Vaccine (Unspecified Type) 2/22/2010 Zoster Vaccine, Live 5/13/2010 Reviewed by Micheal Portillo MD on 3/15/2018 at  8:26 AM  
 Reviewed by Micheal Portillo MD on 3/15/2018 at  8:26 AM  
You Were Diagnosed With   
  
 Codes Comments Essential hypertension     ICD-10-CM: I10 
ICD-9-CM: 401.9 Medicare annual wellness visit, subsequent     ICD-10-CM: Z00.00 ICD-9-CM: V70.0 Screening for alcoholism     ICD-10-CM: Z13.89 ICD-9-CM: V79.1 Screening for diabetes mellitus     ICD-10-CM: Z13.1 ICD-9-CM: V77.1 Screening for ischemic heart disease     ICD-10-CM: Z13.6 ICD-9-CM: V81.0 Vitals BP Pulse Temp Resp Height(growth percentile) Weight(growth percentile) 142/86 (BP 1 Location: Left arm, BP Patient Position: Sitting) 75 98.1 °F (36.7 °C) (Oral) 18 5' 3\" (1.6 m) 165 lb (74.8 kg) SpO2 BMI OB Status Smoking Status 98% 29.23 kg/m2 Hysterectomy Never Smoker Vitals History BMI and BSA Data Body Mass Index Body Surface Area  
 29.23 kg/m 2 1.82 m 2 Preferred Pharmacy Pharmacy Name Phone ON SITE [address] [facility] Your Updated Medication List  
  
   
This list is accurate as of 3/15/18  8:32 AM.  Always use your most recent med list.  
  
  
  
  
 aspirin 81 mg chewable tablet Take 81 mg by mouth daily. atorvastatin 20 mg tablet Commonly known as:  LIPITOR  
TAKE ONE TABLET BY MOUTH EVERY DAY  
  
 CALTRATE 600 600 mg calcium (1,500 mg) tablet Take  by mouth two (2) times a day. chlorthalidone 25 mg tablet Commonly known as:  HYGROTEN  
TAKE 1 TABLET BY MOUTH ONCE DAILY  
  
 estrogens (conjugated) 0.45 mg tablet Commonly known as:  PREMARIN  
TAKE ONE TABLET BY MOUTH EVERY DAY  
  
 FISH OIL 1,000 mg Cap Generic drug:  omega-3 fatty acids-vitamin e Take  by mouth daily. MCHNQBNO-OPSQQAOSXW-YD GLYCN-C PO Take  by mouth daily. multivitamin tablet Commonly known as:  ONE A DAY Take 1 Tab by mouth daily. pneumococcal 13 eloy conj dip 0.5 mL Syrg injection Commonly known as:  PREVNAR 13 (PF)  
0.5 mL by IntraMUSCular route once for 1 dose. TOPROL XL 50 mg XL tablet Generic drug:  metoprolol succinate Take  by mouth daily. VITAMIN B-6 100 mg tablet Generic drug:  pyridoxine (vitamin B6) Take 100 mg by mouth daily. VITAMIN D 2,000 unit Cap capsule Generic drug:  Cholecalciferol (Vitamin D3) Take  by mouth daily. Prescriptions Printed Refills  
 pneumococcal 13 eloy conj dip (PREVNAR 13, PF,) 0.5 mL syrg injection 0 Si.5 mL by IntraMUSCular route once for 1 dose. Class: Print Route: IntraMUSCular We Performed the Following [place] ANNUAL ALCOHOL SCREEN 15 MIN [id number] [facility]] Follow-up Instructions Return in about 4 weeks (around 4/12/2018), or if symptoms worsen or fail to improve. Patient Instructions Medicare Wellness Visit, Female The best way to live healthy is to have a healthy lifestyle by eating a well-balanced diet, exercising regularly, limiting alcohol and stopping smoking. Regular physical exams and screening tests are another way to keep healthy. Preventive exams provided by your health care provider can find health problems before they become diseases or illnesses. Preventive services including immunizations, screening tests, monitoring and exams can help you take care of your own health. All people over age 72 should have a pneumovax  and and a prevnar shot to prevent pneumonia. These are once in a lifetime unless you and your provider decide differently. All people over 65 should have a yearly flu shot and a tetanus vaccine every 10 years. A bone mass density to screen for osteoporosis or thinning of the bones should be done every 2 years after 65. Screening for diabetes mellitus with a blood sugar test should be done every year. Glaucoma is a disease of the eye due to increased ocular pressure that can lead to blindness and it should be done every year by an eye professional. 
 
Cardiovascular screening tests that check for elevated lipids (fatty part of blood) which can lead to heart disease and strokes should be done every 5 years. Colorectal screening that evaluates for blood or polyps in your colon should be done yearly as a stool test or every five years as a flexible sigmoidoscope or every 10 years as a colonoscopy up to age 76. Breast cancer screening with a mammogram is recommended biennially  for women age 54-69.  
 
Screening for cervical cancer with a pap smear and pelvic exam is recommended for women after age 72 years every 2 years up to age 79 or when the provider and patient decide to stop. If there is a history of cervical abnormalities or other increased risk for cancer then the test is recommended yearly. Hepatitis C screening is also recommended for anyone born between [birth year] through [address]. A shingles vaccine is also recommended once in a lifetime after age 61. Your Medicare Wellness Exam is recommended annually. Here is a list of your current Health Maintenance items with a due date: 
Health Maintenance Due Topic Date Due  Pneumococcal Vaccine (2 of 2 - PPSV23) 02/22/2015  Glaucoma Screening   10/23/2017 Donna Carter Annual Well Visit  03/03/2018 Introducing [facility]! Radha Johns introduces [employer] patient portal. Now you can access parts of your medical record, email your doctor's office, and request medication refills online. 1. In your internet browser, go to [email or web address] 2. Click on the First Time User? Click Here link in the Sign In box. You will see the New Member Sign Up page. 3. Enter your [employer] Access Code exactly as it appears below. You will not need to use this code after youve completed the sign-up process. If you do not sign up before the expiration date, you must request a new code. · [employer] Access Code: [email or web address] Expires: 6/13/2018  8:32 AM 
 
4. Enter the last four digits of your Social Security Number (xxxx) and Date of Birth (mm/dd/yyyy) as indicated and click Submit. You will be taken to the next sign-up page. 5. Create a [employer] ID. This will be your [employer] login ID and cannot be changed, so think of one that is secure and easy to remember. 6. Create a [employer] password. You can change your password at any time. 7. Enter your Password Reset Question and Answer. This can be used at a later time if you forget your password. 8. Enter your e-mail address. You will receive e-mail notification when new information is available in [address] 9. Click Sign Up. You can now view and download portions of your medical record. 10. Click the Download Summary menu link to download a portable copy of your medical information. If you have questions, please visit the Frequently Asked Questions section of the [employer] website. Remember, [employer] is NOT to be used for urgent needs. For medical emergencies, dial 911. Now available from your iPhone and Android! Please provide this summary of care documentation to your next provider. Your primary care clinician is listed as [id number]. If you have any questions after today's visit, please call [phone].

## 2018-03-15 NOTE — PATIENT INSTRUCTIONS

## 2018-03-15 NOTE — PROGRESS NOTES
Elza Schaefer is a 67 y.o. female (: 1945) presenting to     Chief Complaint   Patient presents with    Annual Wellness Visit       Vitals:    03/15/18 0752   BP: 142/86   Pulse: 75   Resp: 18   Temp: 98.1 °F (36.7 °C)   TempSrc: Oral   SpO2: 98%   Weight: 165 lb (74.8 kg)   Height: 5' 3\" (1.6 m)   PainSc:   0 - No pain       Hearing/Vision:      Visual Acuity Screening    Right eye Left eye Both eyes   Without correction:      With correction: 20/15 20/15 20/15   Comments: Glasses       Learning Assessment:     Learning Assessment 3/25/2015   PRIMARY LEARNER Patient   HIGHEST LEVEL OF EDUCATION - PRIMARY LEARNER  > 4 YEARS OF COLLEGE   BARRIERS PRIMARY LEARNER NONE   CO-LEARNER CAREGIVER No   PRIMARY LANGUAGE ENGLISH   LEARNER PREFERENCE PRIMARY DEMONSTRATION   ANSWERED BY self   RELATIONSHIP SELF     Depression Screening:     PHQ over the last two weeks 3/15/2018   Little interest or pleasure in doing things Not at all   Feeling down, depressed or hopeless Not at all   Total Score PHQ 2 0     Fall Risk Assessment:     Fall Risk Assessment, last 12 mths 3/15/2018   Able to walk? Yes   Fall in past 12 months? Yes   Fall with injury? No   Number of falls in past 12 months 1   Fall Risk Score 1     Abuse Screening:     Abuse Screening Questionnaire 3/15/2018   Do you ever feel afraid of your partner? N   Are you in a relationship with someone who physically or mentally threatens you? N   Is it safe for you to go home? Y     Coordination of Care Questionaire:   1. Have you been to the ER, urgent care clinic since your last visit? Hospitalized since your last visit? no    2. Have you seen or consulted any other health care providers outside of the [address] [place] [address] since your last visit? Include any pap smears or colon screening. eye doctor     Advanced Directive:   1. Do you have an Advanced Directive? YES    2. Would you like information on Advanced Directives?  NO    Health Maintenance Due   Topic Date Due    Pneumococcal 65+ Low/Medium Risk (2 of 2 - PPSV23) 02/22/2015    GLAUCOMA SCREENING Q2Y  10/23/2017    MEDICARE YEARLY EXAM  03/03/2018

## 2018-03-15 NOTE — PROGRESS NOTES
Assessment/Plan:    *Diagnoses and all orders for this visit:    1. Essential hypertension    2. Medicare annual wellness visit, subsequent    3. Screening for alcoholism  -     Annual  Alcohol Screen 15 min ()    4. Screening for diabetes mellitus    5. Screening for ischemic heart disease    6. Pure hypercholesterolemia    Other orders  -     pneumococcal 13 eloy conj dip (PREVNAR 13, PF,) 0.5 mL syrg injection; 0.5 mL by IntraMUSCular route once for 1 dose. Will increase Toprol to 50 mg. Pt will continue to monitor readings at home several day a week and return in 1 month. The plan was discussed with the patient. The patient verbalized understanding and is in agreement with the plan. All medication potential side effects were discussed with the patient.    -------------------------------------------------------------------------------------------------------------------        Ruddy Robertson is a 67 y.o. female and presents with Annual Wellness Visit         Subjective:  Pt here for f/u. HTN: readings at home are still not quite at goal.  Running syst 140's, on occasion a 150's. HLD: well controlled. ROS:  Constitutional: No recent weight change. No weakness/fatigue. No f/c. Skin: No rashes, change in nails/hair, itching   HENT: No HA, dizziness. No hearing loss/tinnitus. No nasal congestion/discharge. Eyes: No change in vision, double/blurred vision or eye pain/redness. Cardiovascular: No CP/palpitations. No ARANA/orthopnea/PND. Respiratory: No cough/sputum, dyspnea, wheezing. Gastointestinal: No dysphagia, reflux. No n/v. No constipation/diarrhea. No melena/rectal bleeding. Genitourinary: No dysuria, urinary hesitancy, nocturia, hematuria. No incontinence. Musculoskeletal: No joint pain/stiffness. No muscle pain/tenderness. Endo: No heat/cold intolerance, no polyuria/polydypsia. Heme: No h/o anemia. No easy bleeding/bruising.    Allergy/Immunology: No seasonal rhinitis. Denies frequent colds, sinus/ear infections. Neurological: No seizures/numbness/weakness. No paresthesias. Psychiatric:  No depression, anxiety. The problem list was updated as a part of today's visit. Patient Active Problem List   Diagnosis Code    Postmenopausal Z78.0    Hyperlipidemia E78.5    Advance care planning Z71.89    Essential hypertension I10       The PSH, FH were reviewed. SH:  Social History   Substance Use Topics    Smoking status: Never Smoker    Smokeless tobacco: Never Used    Alcohol use 1.0 oz/week     2 Glasses of wine per week       Medications/Allergies:  Current Outpatient Prescriptions on File Prior to Visit   Medication Sig Dispense Refill    chlorthalidone (HYGROTEN) 25 mg tablet TAKE 1 TABLET BY MOUTH ONCE DAILY 90 Tab 0    atorvastatin (LIPITOR) 20 mg tablet TAKE ONE TABLET BY MOUTH EVERY DAY 90 Tab 1    estrogens, conjugated, (PREMARIN) 0.45 mg tablet TAKE ONE TABLET BY MOUTH EVERY DAY 90 Tab 1    Cholecalciferol, Vitamin D3, (VITAMIN D) 2,000 unit Cap Take  by mouth daily.  multivitamin (ONE A DAY) tablet Take 1 Tab by mouth daily.  pyridoxine (VITAMIN B-6) 100 mg tablet Take 100 mg by mouth daily.  aspirin 81 mg chewable tablet Take 81 mg by mouth daily.  omega-3 fatty acids-vitamin e (FISH OIL) 1,000 mg Cap Take  by mouth daily.  calcium carbonate (CALTRATE 600) 1,500 (600) mg Tab Take  by mouth two (2) times a day.  GLUC HCL/CSANA/GLY-AM-GLY,MX/C ([id number]-XW GLYCN-C PO) Take  by mouth daily. No current facility-administered medications on file prior to visit.          Allergies   Allergen Reactions    Ace Inhibitors Cough    Penicillin G Rash         Health Maintenance:   Health Maintenance   Topic Date Due    Pneumococcal 65+ Low/Medium Risk (2 of 2 - PPSV23) 02/22/2015    GLAUCOMA SCREENING Q2Y  10/23/2017    MEDICARE YEARLY EXAM  03/03/2018    BREAST CANCER SCRN MAMMOGRAM 02/09/2019    COLONOSCOPY  08/12/2024    DTaP/Tdap/Td series (3 - Td) 09/21/2027    Bone Densitometry (Dexa) Screening  Completed    ZOSTER VACCINE AGE 60>  Completed    Influenza Age 5 to Adult  Completed       Objective:  Visit Vitals    /86 (BP 1 Location: Left arm, BP Patient Position: Sitting)    Pulse 75    Temp 98.1 °F (36.7 °C) (Oral)    Resp 18    Ht 5' 3\" (1.6 m)    Wt 165 lb (74.8 kg)    SpO2 98%    BMI 29.23 kg/m2          Nurses notes and VS reviewed.       Physical Examination: General appearance - alert, well appearing, and in no distress  Ears - bilateral TM's and external ear canals normal  Mouth - mucous membranes moist, pharynx normal without lesions  Neck - supple, no significant adenopathy  Chest - clear to auscultation, no wheezes, rales or rhonchi, symmetric air entry  Heart - normal rate, regular rhythm, normal S1, S2, no murmurs, rubs, clicks or gallops  Abdomen - soft, nontender, nondistended, no masses or organomegaly  Musculoskeletal - no joint tenderness, deformity or swelling  Extremities - peripheral pulses normal, no pedal edema, no clubbing or cyanosis        Labwork and Ancillary Studies:    CBC w/Diff  Lab Results   Component Value Date/Time    WBC 6.2 03/08/2018 08:19 AM    HGB 13.3 03/08/2018 08:19 AM    PLATELET 961 21/18/6038 08:19 AM         Basic Metabolic Profile  Lab Results   Component Value Date/Time    Sodium 140 03/08/2018 08:19 AM    Potassium 4.2 03/08/2018 08:19 AM    Chloride 103 03/08/2018 08:19 AM    CO2 30 03/08/2018 08:19 AM    Anion gap 7 03/08/2018 08:19 AM    Glucose 99 03/08/2018 08:19 AM    BUN 17 03/08/2018 08:19 AM    Creatinine 0.63 03/08/2018 08:19 AM    BUN/Creatinine ratio 27 (H) 03/08/2018 08:19 AM    GFR est AA >60 03/08/2018 08:19 AM    GFR est non-AA >60 03/08/2018 08:19 AM    Calcium 9.3 03/08/2018 08:19 AM         LFT  Lab Results   Component Value Date/Time    ALT (SGPT) 35 03/08/2018 08:19 AM    AST (SGOT) 27 03/08/2018 08:19 AM    Alk.  phosphatase 66 03/08/2018 08:19 AM    Bilirubin, direct <0.1 03/08/2018 08:19 AM    Bilirubin, total 0.3 03/08/2018 08:19 AM         Cholesterol  Lab Results   Component Value Date/Time    Cholesterol, total 199 03/08/2018 08:19 AM    HDL Cholesterol 101 (H) 03/08/2018 08:19 AM    LDL, calculated 65.2 03/08/2018 08:19 AM    Triglyceride 164 (H) 03/08/2018 08:19 AM    CHOL/HDL Ratio 2.0 03/08/2018 08:19 AM

## 2018-04-13 ENCOUNTER — OFFICE VISIT (OUTPATIENT)
Dept: FAMILY MEDICINE CLINIC | Age: 73
End: 2018-04-13

## 2018-04-13 VITALS
OXYGEN SATURATION: 97 % | BODY MASS INDEX: 28.88 KG/M2 | HEIGHT: 63 IN | HEART RATE: 77 BPM | TEMPERATURE: 98.5 F | WEIGHT: 163 LBS | SYSTOLIC BLOOD PRESSURE: 130 MMHG | DIASTOLIC BLOOD PRESSURE: 80 MMHG | RESPIRATION RATE: 17 BRPM

## 2018-04-13 DIAGNOSIS — I10 ESSENTIAL HYPERTENSION: Primary | ICD-10-CM

## 2018-04-13 NOTE — MR AVS SNAPSHOT
[address] [address] [facility] [address] 
[phone] Patient: Shakira Boyer MRN: [id number]:[id number] Visit Information Date & Time Provider Department Dept. Phone Encounter #  
 4/13/2018  8:00 AM Dante Waggoner MD [address] [place] [address] [id number] Upcoming Health Maintenance Date Due  
 GLAUCOMA SCREENING Q2Y 10/23/2017 BREAST CANCER SCRN MAMMOGRAM 2/9/2019 MEDICARE YEARLY EXAM 3/16/2019 COLONOSCOPY 8/12/2024 DTaP/Tdap/Td series (3 - Td) 9/21/2027 Allergies as of 4/13/2018  Review Complete On: 3/15/2018 By: Dante Waggoner MD  
  
 Severity Noted Reaction Type Reactions Ace Inhibitors  03/02/2017    Cough Penicillin G  01/20/2010   Side Effect Rash Current Immunizations  Reviewed on 4/13/2018 Name Date Influenza High Dose Vaccine PF 9/12/2016, 9/24/2015, 9/25/2013, 9/25/2013 Influenza Vaccine 9/15/2014 Influenza Vaccine Whole 9/20/2012 Pneumococcal Conjugate (PCV-13) 3/19/2018 Tdap 8/25/2007 ZZZ-RETIRED (DO NOT USE) Pneumococcal Vaccine (Unspecified Type) 2/22/2010 Zoster Vaccine, Live 5/13/2010 Reviewed by Roberto Hughes LPN on 2/67/9687 at  7:55 AM  
 Reviewed by Roberto Hughes LPN on 4/56/5415 at  7:55 AM  
 Reviewed by Dante Waggoner MD on 4/13/2018 at  8:10 AM  
Vitals BP Pulse Temp Resp Height(growth percentile) Weight(growth percentile) 130/80 (BP 1 Location: Left arm, BP Patient Position: Sitting) 77 98.5 °F (36.9 °C) (Oral) 17 5' 3\" (1.6 m) 163 lb (73.9 kg) SpO2 BMI OB Status Smoking Status 97% 28.87 kg/m2 Hysterectomy Never Smoker Vitals History BMI and BSA Data Body Mass Index Body Surface Area  
 28.87 kg/m 2 1.81 m 2 Preferred Pharmacy Pharmacy Name Phone ON SITE [facility], [address] [place] [address] Sender Your Updated Medication List  
  
   
 This list is accurate as of 4/13/18  8:13 AM.  Always use your most recent med list.  
  
  
  
  
 aspirin 81 mg chewable tablet Take 81 mg by mouth daily. atorvastatin 20 mg tablet Commonly known as:  LIPITOR  
TAKE ONE TABLET BY MOUTH EVERY DAY  
  
 CALTRATE 600 600 mg calcium (1,500 mg) tablet Take  by mouth two (2) times a day. chlorthalidone 25 mg tablet Commonly known as:  HYGROTEN  
TAKE 1 TABLET BY MOUTH ONCE DAILY  
  
 estrogens (conjugated) 0.45 mg tablet Commonly known as:  PREMARIN  
TAKE ONE TABLET BY MOUTH EVERY DAY  
  
 FISH OIL 1,000 mg Cap Generic drug:  omega-3 fatty acids-vitamin e Take  by mouth daily. HSBUNWEN-CMWDDUHNEE-YD GLYCN-C PO Take  by mouth daily. multivitamin tablet Commonly known as:  ONE A DAY Take 1 Tab by mouth daily. TOPROL XL 50 mg XL tablet Generic drug:  metoprolol succinate Take  by mouth daily. VITAMIN B-6 100 mg tablet Generic drug:  pyridoxine (vitamin B6) Take 100 mg by mouth daily. VITAMIN D 2,000 unit Cap capsule Generic drug:  Cholecalciferol (Vitamin D3) Take  by mouth daily. Patient Instructions High Blood Pressure: Care Instructions Your Care Instructions If your blood pressure is usually above 140/90, you have high blood pressure, or hypertension. That means the top number is 140 or higher or the bottom number is 90 or higher, or both. Despite what a lot of people think, high blood pressure usually doesn't cause headaches or make you feel dizzy or lightheaded. It usually has no symptoms. But it does increase your risk for heart attack, stroke, and kidney or eye damage. The higher your blood pressure, the more your risk increases. Your doctor will give you a goal for your blood pressure. Your goal will be based on your health and your age. An example of a goal is to keep your blood pressure below 140/90. Lifestyle changes, such as eating healthy and being active, are always important to help lower blood pressure. You might also take medicine to reach your blood pressure goal. 
Follow-up care is a key part of your treatment and safety. Be sure to make and go to all appointments, and call your doctor if you are having problems. It's also a good idea to know your test results and keep a list of the medicines you take. How can you care for yourself at home? Medical treatment · If you stop taking your medicine, your blood pressure will go back up. You may take one or more types of medicine to lower your blood pressure. Be safe with medicines. Take your medicine exactly as prescribed. Call your doctor if you think you are having a problem with your medicine. · Talk to your doctor before you start taking aspirin every day. Aspirin can help certain people lower their risk of a heart attack or stroke. But taking aspirin isn't right for everyone, because it can cause serious bleeding. · See your doctor regularly. You may need to see the doctor more often at first or until your blood pressure comes down. · If you are taking blood pressure medicine, talk to your doctor before you take decongestants or anti-inflammatory medicine, such as ibuprofen. Some of these medicines can raise blood pressure. · Learn how to check your blood pressure at home. Lifestyle changes · Stay at a healthy weight. This is especially important if you put on weight around the waist. Losing even 10 pounds can help you lower your blood pressure. · If your doctor recommends it, get more exercise. Walking is a good choice. Bit by bit, increase the amount you walk every day. Try for at least 30 minutes on most days of the week. You also may want to swim, bike, or do other activities. · Avoid or limit alcohol. Talk to your doctor about whether you can drink any alcohol.  
· Try to limit how much sodium you eat to less than 2,300 milligrams (mg) a day. Your doctor may ask you to try to eat less than 1,500 mg a day. · Eat plenty of fruits (such as bananas and oranges), vegetables, legumes, whole grains, and low-fat dairy products. · Lower the amount of saturated fat in your diet. Saturated fat is found in animal products such as milk, cheese, and meat. Limiting these foods may help you lose weight and also lower your risk for heart disease. · Do not smoke. Smoking increases your risk for heart attack and stroke. If you need help quitting, talk to your doctor about stop-smoking programs and medicines. These can increase your chances of quitting for good. When should you call for help? Call 911 anytime you think you may need emergency care. This may mean having symptoms that suggest that your blood pressure is causing a serious heart or blood vessel problem. Your blood pressure may be over 180/110. ? For example, call 911 if: 
? · You have symptoms of a heart attack. These may include: ¨ Chest pain or pressure, or a strange feeling in the chest. 
¨ Sweating. ¨ Shortness of breath. ¨ Nausea or vomiting. ¨ Pain, pressure, or a strange feeling in the back, neck, jaw, or upper belly or in one or both shoulders or arms. ¨ Lightheadedness or sudden weakness. ¨ A fast or irregular heartbeat. ? · You have symptoms of a stroke. These may include: 
¨ Sudden numbness, tingling, weakness, or loss of movement in your face, arm, or leg, especially on only one side of your body. ¨ Sudden vision changes. ¨ Sudden trouble speaking. ¨ Sudden confusion or trouble understanding simple statements. ¨ Sudden problems with walking or balance. ¨ A sudden, severe headache that is different from past headaches. ? · You have severe back or belly pain. ?Do not wait until your blood pressure comes down on its own. Get help right away. ?Call your doctor now or seek immediate care if: 
? · Your blood pressure is much higher than normal (such as 180/110 or higher), but you don't have symptoms. ? · You think high blood pressure is causing symptoms, such as: ¨ Severe headache. ¨ Blurry vision. ? Watch closely for changes in your health, and be sure to contact your doctor if: 
? · Your blood pressure measures 140/90 or higher at least 2 times. That means the top number is 140 or higher or the bottom number is 90 or higher, or both. ? · You think you may be having side effects from your blood pressure medicine. ? · Your blood pressure is usually normal, but it goes above normal at least 2 times. Where can you learn more? Go to http://aline-lakhwinder.info/. Enter V193 in the search box to learn more about \"High Blood Pressure: Care Instructions. \" Current as of: September 21, 2016 Content Version: 11.4 © 6681-8355 [employer]. Care instructions adapted under license by [employer] (which disclaims liability or warranty for this information). If you have questions about a medical condition or this instruction, always ask your healthcare professional. [place] [address] any warranty or liability for your use of this information. Introducing [facility]! Jaime Valdivia introduces "[employer] patient portal. Now you can access parts of your medical record, email your doctor's office, and request medication refills online. 1. In your internet browser, go to [email or web address] 2. Click on the First Time User? Click Here link in the Sign In box. You will see the New Member Sign Up page. 3. Enter your "[employer] Access Code exactly as it appears below. You will not need to use this code after youve completed the sign-up process. If you do not sign up before the expiration date, you must request a new code. · "[employer] Access Code: [email or web address] Expires: 6/13/2018  8:32 AM 
 
4.  Enter the last four digits of your Social Security Number (xxxx) and Date of Birth (mm/dd/yyyy) as indicated and click Submit. You will be taken to the next sign-up page. 5. Create a PartyLine ID. This will be your PartyLine login ID and cannot be changed, so think of one that is secure and easy to remember. 6. Create a [employer]Line password. You can change your password at any time. 7. Enter your Password Reset Question and Answer. This can be used at a later time if you forget your password. 8. Enter your e-mail address. You will receive e-mail notification when new information is available in [address] 9. Click Sign Up. You can now view and download portions of your medical record. 10. Click the Download Summary menu link to download a portable copy of your medical information. If you have questions, please visit the Frequently Asked Questions section of the [employer] website. Remember, [employer] is NOT to be used for urgent needs. For medical emergencies, dial 911. Now available from your iPhone and Android! Please provide this summary of care documentation to your next provider. Your primary care clinician is listed as Petr 51. If you have any questions after today's visit, please call [phone].

## 2018-04-13 NOTE — PROGRESS NOTES
Adela So is a 67 y.o. female (: 1945) presenting to address:    Presents for htn    Vitals:    18 0753   BP: 130/80   Pulse: 77   Resp: 17   Temp: 98.5 °F (36.9 °C)   TempSrc: Oral   SpO2: 97%   Weight: 163 lb (73.9 kg)   Height: 5' 3\" (1.6 m)   PainSc:   0 - No pain       Hearing/Vision:   No exam data present    Learning Assessment:     Learning Assessment 3/25/2015   PRIMARY LEARNER Patient   HIGHEST LEVEL OF EDUCATION - PRIMARY LEARNER  > 4 YEARS OF COLLEGE   BARRIERS PRIMARY LEARNER NONE   CO-LEARNER CAREGIVER No   PRIMARY LANGUAGE ENGLISH   LEARNER PREFERENCE PRIMARY DEMONSTRATION   ANSWERED BY self   RELATIONSHIP SELF     Depression Screening:     PHQ over the last two weeks 2018   Little interest or pleasure in doing things Not at all   Feeling down, depressed or hopeless Not at all   Total Score PHQ 2 0     Fall Risk Assessment:     Fall Risk Assessment, last 12 mths 2018   Able to walk? Yes   Fall in past 12 months? Yes   Fall with injury? No   Number of falls in past 12 months 1   Fall Risk Score 1     Abuse Screening:     Abuse Screening Questionnaire 3/15/2018   Do you ever feel afraid of your partner? N   Are you in a relationship with someone who physically or mentally threatens you? N   Is it safe for you to go home? Y     Coordination of Care Questionaire:   1. Have you been to the ER, urgent care clinic since your last visit? Hospitalized since your last visit? No     2. Have you seen or consulted any other health care providers outside of the [address] [place] [address] since your last visit? Include any pap smears or colon screening. Yes, eye exam 2018       Advanced Directive:   1. Do you have an Advanced Directive yes    2. Would you like information on Advanced Directives?  No   Health Maintenance Due   Topic Date Due    Pneumococcal 65+ Low/Medium Risk (2 of 2 - PPSV23) 2015    GLAUCOMA SCREENING Q2Y  10/23/2017

## 2018-04-13 NOTE — PROGRESS NOTES
Assessment/Plan:    *Diagnoses and all orders for this visit:    1. Essential hypertension      Routine f/u in Sept.    The plan was discussed with the patient. The patient verbalized understanding and is in agreement with the plan. All medication potential side effects were discussed with the patient.    -------------------------------------------------------------------------------------------------------------------        Camilla Foster is a 67 y.o. female and presents with Hypertension         Subjective:  Pt here for f/u of HTN. We increased Toprol to 50 mg. Her readings have been better at home and definitely here. ROS:  Constitutional: No recent weight change. No weakness/fatigue. No f/c. Skin: No rashes, change in nails/hair, itching   HENT: No HA, dizziness. No hearing loss/tinnitus. No nasal congestion/discharge. Eyes: No change in vision, double/blurred vision or eye pain/redness. Cardiovascular: No CP/palpitations. No ARANA/orthopnea/PND. Respiratory: No cough/sputum, dyspnea, wheezing. Gastointestinal: No dysphagia, reflux. No n/v. No constipation/diarrhea. No melena/rectal bleeding. Genitourinary: No dysuria, urinary hesitancy, nocturia, hematuria. No incontinence. Musculoskeletal: No joint pain/stiffness. No muscle pain/tenderness. Endo: No heat/cold intolerance, no polyuria/polydypsia. Heme: No h/o anemia. No easy bleeding/bruising. Allergy/Immunology: No seasonal rhinitis. Denies frequent colds, sinus/ear infections. Neurological: No seizures/numbness/weakness. No paresthesias. Psychiatric:  No depression, anxiety. The problem list was updated as a part of today's visit. Patient Active Problem List   Diagnosis Code    Postmenopausal Z78.0    Hyperlipidemia E78.5    Advance care planning Z71.89    Essential hypertension I10       The PSH, FH were reviewed.         SH:  Social History   Substance Use Topics    Smoking status: Never Smoker    Smokeless tobacco: Never Used    Alcohol use 1.0 oz/week     2 Glasses of wine per week       Medications/Allergies:  Current Outpatient Prescriptions on File Prior to Visit   Medication Sig Dispense Refill    metoprolol succinate (TOPROL XL) 50 mg XL tablet Take  by mouth daily.  chlorthalidone (HYGROTEN) 25 mg tablet TAKE 1 TABLET BY MOUTH ONCE DAILY 90 Tab 0    atorvastatin (LIPITOR) 20 mg tablet TAKE ONE TABLET BY MOUTH EVERY DAY 90 Tab 1    estrogens, conjugated, (PREMARIN) 0.45 mg tablet TAKE ONE TABLET BY MOUTH EVERY DAY 90 Tab 1    Cholecalciferol, Vitamin D3, (VITAMIN D) 2,000 unit Cap Take  by mouth daily.  multivitamin (ONE A DAY) tablet Take 1 Tab by mouth daily.  pyridoxine (VITAMIN B-6) 100 mg tablet Take 100 mg by mouth daily.  aspirin 81 mg chewable tablet Take 81 mg by mouth daily.  omega-3 fatty acids-vitamin e (FISH OIL) 1,000 mg Cap Take  by mouth daily.  calcium carbonate (CALTRATE 600) 1,500 (600) mg Tab Take  by mouth two (2) times a day.  GLUC HCL/CSANA/GLY-AM-GLY,MX/C (TWUAEGVX-AOTVMSUKRA-LX GLYCN-C PO) Take  by mouth daily. No current facility-administered medications on file prior to visit.          Allergies   Allergen Reactions    Ace Inhibitors Cough    Penicillin G Rash         Health Maintenance:   Health Maintenance   Topic Date Due    GLAUCOMA SCREENING Q2Y  10/23/2017    BREAST CANCER SCRN MAMMOGRAM  02/09/2019    MEDICARE YEARLY EXAM  03/16/2019    COLONOSCOPY  08/12/2024    DTaP/Tdap/Td series (3 - Td) 09/21/2027    Bone Densitometry (Dexa) Screening  Completed    ZOSTER VACCINE AGE 60>  Completed    Pneumococcal 65+ Low/Medium Risk  Completed    Influenza Age 5 to Adult  Completed       Objective:  Visit Vitals    /80 (BP 1 Location: Left arm, BP Patient Position: Sitting)    Pulse 77    Temp 98.5 °F (36.9 °C) (Oral)    Resp 17    Ht 5' 3\" (1.6 m)    Wt 163 lb (73.9 kg)    SpO2 97%    BMI 28.87 kg/m2 Nurses notes and VS reviewed. Physical Examination: General appearance - alert, well appearing, and in no distress        Labwork and Ancillary Studies:    CBC w/Diff  Lab Results   Component Value Date/Time    WBC 6.2 03/08/2018 08:19 AM    HGB 13.3 03/08/2018 08:19 AM    PLATELET 323 26/44/2337 08:19 AM         Basic Metabolic Profile  Lab Results   Component Value Date/Time    Sodium 140 03/08/2018 08:19 AM    Potassium 4.2 03/08/2018 08:19 AM    Chloride 103 03/08/2018 08:19 AM    CO2 30 03/08/2018 08:19 AM    Anion gap 7 03/08/2018 08:19 AM    Glucose 99 03/08/2018 08:19 AM    BUN 17 03/08/2018 08:19 AM    Creatinine 0.63 03/08/2018 08:19 AM    BUN/Creatinine ratio 27 (H) 03/08/2018 08:19 AM    GFR est AA >60 03/08/2018 08:19 AM    GFR est non-AA >60 03/08/2018 08:19 AM    Calcium 9.3 03/08/2018 08:19 AM         LFT  Lab Results   Component Value Date/Time    ALT (SGPT) 35 03/08/2018 08:19 AM    AST (SGOT) 27 03/08/2018 08:19 AM    Alk.  phosphatase 66 03/08/2018 08:19 AM    Bilirubin, direct <0.1 03/08/2018 08:19 AM    Bilirubin, total 0.3 03/08/2018 08:19 AM         Cholesterol  Lab Results   Component Value Date/Time    Cholesterol, total 199 03/08/2018 08:19 AM    HDL Cholesterol 101 (H) 03/08/2018 08:19 AM    LDL, calculated 65.2 03/08/2018 08:19 AM    Triglyceride 164 (H) 03/08/2018 08:19 AM    CHOL/HDL Ratio 2.0 03/08/2018 08:19 AM

## 2018-04-13 NOTE — PATIENT INSTRUCTIONS
High Blood Pressure: Care Instructions  Your Care Instructions    If your blood pressure is usually above 140/90, you have high blood pressure, or hypertension. That means the top number is 140 or higher or the bottom number is 90 or higher, or both. Despite what a lot of people think, high blood pressure usually doesn't cause headaches or make you feel dizzy or lightheaded. It usually has no symptoms. But it does increase your risk for heart attack, stroke, and kidney or eye damage. The higher your blood pressure, the more your risk increases. Your doctor will give you a goal for your blood pressure. Your goal will be based on your health and your age. An example of a goal is to keep your blood pressure below 140/90. Lifestyle changes, such as eating healthy and being active, are always important to help lower blood pressure. You might also take medicine to reach your blood pressure goal.  Follow-up care is a key part of your treatment and safety. Be sure to make and go to all appointments, and call your doctor if you are having problems. It's also a good idea to know your test results and keep a list of the medicines you take. How can you care for yourself at home? Medical treatment  · If you stop taking your medicine, your blood pressure will go back up. You may take one or more types of medicine to lower your blood pressure. Be safe with medicines. Take your medicine exactly as prescribed. Call your doctor if you think you are having a problem with your medicine. · Talk to your doctor before you start taking aspirin every day. Aspirin can help certain people lower their risk of a heart attack or stroke. But taking aspirin isn't right for everyone, because it can cause serious bleeding. · See your doctor regularly. You may need to see the doctor more often at first or until your blood pressure comes down.   · If you are taking blood pressure medicine, talk to your doctor before you take decongestants or anti-inflammatory medicine, such as ibuprofen. Some of these medicines can raise blood pressure. · Learn how to check your blood pressure at home. Lifestyle changes  · Stay at a healthy weight. This is especially important if you put on weight around the waist. Losing even 10 pounds can help you lower your blood pressure. · If your doctor recommends it, get more exercise. Walking is a good choice. Bit by bit, increase the amount you walk every day. Try for at least 30 minutes on most days of the week. You also may want to swim, bike, or do other activities. · Avoid or limit alcohol. Talk to your doctor about whether you can drink any alcohol. · Try to limit how much sodium you eat to less than 2,300 milligrams (mg) a day. Your doctor may ask you to try to eat less than 1,500 mg a day. · Eat plenty of fruits (such as bananas and oranges), vegetables, legumes, whole grains, and low-fat dairy products. · Lower the amount of saturated fat in your diet. Saturated fat is found in animal products such as milk, cheese, and meat. Limiting these foods may help you lose weight and also lower your risk for heart disease. · Do not smoke. Smoking increases your risk for heart attack and stroke. If you need help quitting, talk to your doctor about stop-smoking programs and medicines. These can increase your chances of quitting for good. When should you call for help? Call 911 anytime you think you may need emergency care. This may mean having symptoms that suggest that your blood pressure is causing a serious heart or blood vessel problem. Your blood pressure may be over 180/110. ? For example, call 911 if:  ? · You have symptoms of a heart attack. These may include:  ¨ Chest pain or pressure, or a strange feeling in the chest.  ¨ Sweating. ¨ Shortness of breath. ¨ Nausea or vomiting.   ¨ Pain, pressure, or a strange feeling in the back, neck, jaw, or upper belly or in one or both shoulders or arms.  ¨ Lightheadedness or sudden weakness. ¨ A fast or irregular heartbeat. ? · You have symptoms of a stroke. These may include:  ¨ Sudden numbness, tingling, weakness, or loss of movement in your face, arm, or leg, especially on only one side of your body. ¨ Sudden vision changes. ¨ Sudden trouble speaking. ¨ Sudden confusion or trouble understanding simple statements. ¨ Sudden problems with walking or balance. ¨ A sudden, severe headache that is different from past headaches. ? · You have severe back or belly pain. ?Do not wait until your blood pressure comes down on its own. Get help right away. ?Call your doctor now or seek immediate care if:  ? · Your blood pressure is much higher than normal (such as 180/110 or higher), but you don't have symptoms. ? · You think high blood pressure is causing symptoms, such as:  ¨ Severe headache. ¨ Blurry vision. ? Watch closely for changes in your health, and be sure to contact your doctor if:  ? · Your blood pressure measures 140/90 or higher at least 2 times. That means the top number is 140 or higher or the bottom number is 90 or higher, or both. ? · You think you may be having side effects from your blood pressure medicine. ? · Your blood pressure is usually normal, but it goes above normal at least 2 times. Where can you learn more? Go to http://aline-lakhwinder.info/. Enter C100 in the search box to learn more about \"High Blood Pressure: Care Instructions. \"  Current as of: September 21, 2016  Content Version: 11.4  © 6636-1506 [employer]. Care instructions adapted under license by [employer] (which disclaims liability or warranty for this information). If you have questions about a medical condition or this instruction, always ask your healthcare professional. [place] [address] any warranty or liability for your use of this information.

## 2018-04-20 DIAGNOSIS — Z78.0 POSTMENOPAUSAL: ICD-10-CM

## 2018-04-20 RX ORDER — ESTROGENS, CONJUGATED 0.45 MG/1
TABLET, FILM COATED ORAL
Qty: 90 TAB | Refills: 1 | Status: SHIPPED | OUTPATIENT
Start: 2018-04-20 | End: 2018-10-22 | Stop reason: SDUPTHER

## 2018-04-20 RX ORDER — ATORVASTATIN CALCIUM 20 MG/1
TABLET, FILM COATED ORAL
Qty: 90 TAB | Refills: 1 | Status: SHIPPED | OUTPATIENT
Start: 2018-04-20 | End: 2018-10-22 | Stop reason: SDUPTHER

## 2018-05-11 DIAGNOSIS — I10 ESSENTIAL HYPERTENSION: ICD-10-CM

## 2018-05-11 RX ORDER — CHLORTHALIDONE 25 MG/1
TABLET ORAL
Qty: 90 TAB | Refills: 1 | Status: SHIPPED | OUTPATIENT
Start: 2018-05-11 | End: 2018-11-06 | Stop reason: SDUPTHER

## 2018-05-11 RX ORDER — METOPROLOL SUCCINATE 50 MG/1
50 TABLET, EXTENDED RELEASE ORAL DAILY
Qty: 90 TAB | Refills: 1 | Status: SHIPPED | OUTPATIENT
Start: 2018-05-11 | End: 2018-11-06 | Stop reason: SDUPTHER

## 2018-07-23 ENCOUNTER — DOCUMENTATION ONLY (OUTPATIENT)
Dept: FAMILY MEDICINE CLINIC | Age: 73
End: 2018-07-23

## 2018-07-23 NOTE — PROGRESS NOTES
Prior authorizations for Premarin approved from 7/23/2018 to 7/23/2019 copy of approval faxed to pharmacy

## 2018-09-13 ENCOUNTER — OFFICE VISIT (OUTPATIENT)
Dept: FAMILY MEDICINE CLINIC | Age: 73
End: 2018-09-13

## 2018-09-13 VITALS
BODY MASS INDEX: 29.06 KG/M2 | SYSTOLIC BLOOD PRESSURE: 140 MMHG | RESPIRATION RATE: 18 BRPM | HEART RATE: 80 BPM | DIASTOLIC BLOOD PRESSURE: 74 MMHG | WEIGHT: 164 LBS | TEMPERATURE: 97.7 F | OXYGEN SATURATION: 97 % | HEIGHT: 63 IN

## 2018-09-13 DIAGNOSIS — I10 ESSENTIAL HYPERTENSION: Primary | ICD-10-CM

## 2018-09-13 DIAGNOSIS — E78.00 PURE HYPERCHOLESTEROLEMIA: ICD-10-CM

## 2018-09-13 DIAGNOSIS — Z23 ENCOUNTER FOR IMMUNIZATION: ICD-10-CM

## 2018-09-13 DIAGNOSIS — E55.9 HYPOVITAMINOSIS D: ICD-10-CM

## 2018-09-13 NOTE — PROGRESS NOTES
Terra Duran is a 75 y.o. female (: 1945) presenting to address: Chief Complaint Patient presents with  Hypertension Vitals:  
 18 0749 BP: 140/74 Pulse: 80 Resp: 18 Temp: 97.7 °F (36.5 °C) TempSrc: Oral  
SpO2: 97% Weight: 164 lb (74.4 kg) Height: 5' 3\" (1.6 m) PainSc:   0 - No pain Hearing/Vision: No exam data present Learning Assessment:  
 
Learning Assessment 3/25/2015 PRIMARY LEARNER Patient HIGHEST LEVEL OF EDUCATION - PRIMARY LEARNER  > 4 YEARS OF COLLEGE  
BARRIERS PRIMARY LEARNER NONE  
CO-LEARNER CAREGIVER No  
PRIMARY LANGUAGE ENGLISH  
LEARNER PREFERENCE PRIMARY DEMONSTRATION  
ANSWERED BY self RELATIONSHIP SELF Depression Screening: PHQ over the last two weeks 2018 Little interest or pleasure in doing things Not at all Feeling down, depressed, irritable, or hopeless Not at all Total Score PHQ 2 0 Fall Risk Assessment:  
 
Fall Risk Assessment, last 12 mths 2018 Able to walk? Yes Fall in past 12 months? No  
Fall with injury? -  
Number of falls in past 12 months - Fall Risk Score -  
 
Abuse Screening:  
 
Abuse Screening Questionnaire 3/15/2018 Do you ever feel afraid of your partner? Scottie Worthington Are you in a relationship with someone who physically or mentally threatens you? Scottie Worthington Is it safe for you to go home? Venessa Callaway Coordination of Care Questionaire: 1. Have you been to the ER, urgent care clinic since your last visit? Hospitalized since your last visit? No  
 
2. Have you seen or consulted any other health care providers outside of the [facility] since your last visit? Include any pap smears or colon screening. No, upcoming eye exam Dr Elida Duke Advanced Directive: 1. Do you have an Advanced Directive? Yes  
 
2. Would you like information on Advanced Directives? No  
 
 
Health Maintenance Due Topic Date Due  Influenza Age 5 to Adult  2018 wants flu vaccine, possibly TDAP has new great grandbaby

## 2018-09-13 NOTE — PROGRESS NOTES
Assessment/Plan: *Diagnoses and all orders for this visit: 1. Essential hypertension 2. Pure hypercholesterolemia 
-     CBC WITH AUTOMATED DIFF; Future 
-     HEPATIC FUNCTION PANEL; Future -     LIPID PANEL; Future -     METABOLIC PANEL, BASIC; Future 
-     TSH 3RD GENERATION; Future -     T4, FREE; Future -     URINALYSIS W/ RFLX MICROSCOPIC; Future 3. Hypovitaminosis D 
-     VITAMIN D, 25 HYDROXY; Future 4. Encounter for immunization -     Influenza Vaccine Inactivated (IIV)(FLUAD), Subunit, Adjuvanted, IM, (50318) -     Administration fee () for Medicare insured patients Physical in March 2019. BW. The plan was discussed with the patient. The patient verbalized understanding and is in agreement with the plan. All medication potential side effects were discussed with the patient. 
 
------------------------------------------------------------------------------------------------------------------- 
 
 
 
Marcos Riggs is a 75 y.o. female and presents with Hypertension Subjective: 
Pt here for f/u of HTN. Has been doing well, no new complaints. Reviewed her BP log from home with her and readings have been doing well. Her medication regimen is working well for her. ROS: 
Review of Systems - Negative The problem list was updated as a part of today's visit. Patient Active Problem List  
Diagnosis Code  Postmenopausal Z78.0  Hyperlipidemia E78.5  Advance care planning Z71.89  
 Essential hypertension I10 The PSH, FH were reviewed. SH: Social History Substance Use Topics  Smoking status: Never Smoker  Smokeless tobacco: Never Used  Alcohol use 1.0 oz/week 2 Glasses of wine per week Medications/Allergies: 
Current Outpatient Prescriptions on File Prior to Visit Medication Sig Dispense Refill  chlorthalidone (HYGROTEN) 25 mg tablet TAKE 1 TABLET BY MOUTH ONCE DAILY 90 Tab 1  
  metoprolol succinate (TOPROL XL) 50 mg XL tablet Take 1 Tab by mouth daily. 90 Tab 1  
 atorvastatin (LIPITOR) 20 mg tablet TAKE 1 TABLET BY MOUTH ONCE DAILY 90 Tab 1  
 PREMARIN 0.45 mg tablet TAKE ONE TABLET BY MOUTH EVERY DAY 90 Tab 1  Cholecalciferol, Vitamin D3, (VITAMIN D) 2,000 unit Cap Take  by mouth daily.  multivitamin (ONE A DAY) tablet Take 1 Tab by mouth daily.  pyridoxine (VITAMIN B-6) 100 mg tablet Take 100 mg by mouth daily.  aspirin 81 mg chewable tablet Take 81 mg by mouth daily.  omega-3 fatty acids-vitamin e (FISH OIL) 1,000 mg Cap Take  by mouth daily.  calcium carbonate (CALTRATE 600) 1,500 (600) mg Tab Take  by mouth two (2) times a day.  GLUC HCL/CSANA/GLY-AM-GLY,MX/C ([id number] GLYCN-C PO) Take  by mouth daily. No current facility-administered medications on file prior to visit. Allergies Allergen Reactions  Ace Inhibitors Cough  Penicillin G Rash Health Maintenance:  
Health Maintenance Topic Date Due  Influenza Age 5 to Adult  08/01/2018  BREAST CANCER SCRN MAMMOGRAM  02/09/2019  MEDICARE YEARLY EXAM  03/16/2019  GLAUCOMA SCREENING Q2Y  02/09/2020  COLONOSCOPY  08/12/2024  
 DTaP/Tdap/Td series (3 - Td) 09/21/2027  Bone Densitometry (Dexa) Screening  Completed  ZOSTER VACCINE AGE 60>  Completed  Pneumococcal 65+ Low/Medium Risk  Completed Objective: 
Visit Vitals  /74 (BP 1 Location: Left arm, BP Patient Position: Sitting)  Pulse 80  Temp 97.7 °F (36.5 °C) (Oral)  Resp 18  Ht 5' 3\" (1.6 m)  Wt 164 lb (74.4 kg)  SpO2 97%  BMI 29.05 kg/m2 Nurses notes and VS reviewed. Physical Examination: General appearance - alert, well appearing, and in no distress Chest - clear to auscultation, no wheezes, rales or rhonchi, symmetric air entry Heart - normal rate, regular rhythm, normal S1, S2, no murmurs, rubs, clicks or gallops Labwork and Ancillary Studies: CBC w/Diff Lab Results Component Value Date/Time WBC 6.2 03/08/2018 08:19 AM  
 HGB 13.3 03/08/2018 08:19 AM  
 PLATELET 566 52/56/7708 08:19 AM  
  
 
 Basic Metabolic Profile Lab Results Component Value Date/Time Sodium 140 03/08/2018 08:19 AM  
 Potassium 4.2 03/08/2018 08:19 AM  
 Chloride 103 03/08/2018 08:19 AM  
 CO2 30 03/08/2018 08:19 AM  
 Anion gap 7 03/08/2018 08:19 AM  
 Glucose 99 03/08/2018 08:19 AM  
 BUN 17 03/08/2018 08:19 AM  
 Creatinine 0.63 03/08/2018 08:19 AM  
 BUN/Creatinine ratio 27 (H) 03/08/2018 08:19 AM  
 GFR est AA >60 03/08/2018 08:19 AM  
 GFR est non-AA >60 03/08/2018 08:19 AM  
 Calcium 9.3 03/08/2018 08:19 AM  
  
  
LFT Lab Results Component Value Date/Time ALT (SGPT) 35 03/08/2018 08:19 AM  
 AST (SGOT) 27 03/08/2018 08:19 AM  
 Alk. phosphatase 66 03/08/2018 08:19 AM  
 Bilirubin, direct <0.1 03/08/2018 08:19 AM  
 Bilirubin, total 0.3 03/08/2018 08:19 AM  
 
 
 
Cholesterol Lab Results Component Value Date/Time  Cholesterol, total 199 03/08/2018 08:19 AM  
 HDL Cholesterol 101 (H) 03/08/2018 08:19 AM  
 LDL, calculated 65.2 03/08/2018 08:19 AM  
 Triglyceride 164 (H) 03/08/2018 08:19 AM  
 CHOL/HDL Ratio 2.0 03/08/2018 08:19 AM

## 2018-09-13 NOTE — PROGRESS NOTES
Flu shot Immunization/s administered 9/13/2018 by Anila Patel LPN with guardian's consent. Patient tolerated procedure well. No reactions noted.

## 2018-09-13 NOTE — MR AVS SNAPSHOT
[address] [address] [place]  [address] [address] [facility] [id number] [phone] Patient: Ngozi Delaney MRN: [id number] MRC:[id number] Visit Information Date & Time Provider Department Dept. Phone Encounter #  
 9/13/2018  8:00 AM Shawanda Nolan, [employer] [phone] [id number] Upcoming Health Maintenance Date Due Influenza Age 5 to Adult 8/1/2018 BREAST CANCER SCRN MAMMOGRAM 2/9/2019 MEDICARE YEARLY EXAM 3/16/2019 GLAUCOMA SCREENING Q2Y 2/9/2020 COLONOSCOPY 8/12/2024 DTaP/Tdap/Td series (3 - Td) 9/21/2027 Allergies as of 9/13/2018  Review Complete On: 9/13/2018 By: Shawanda Nolan MD  
  
 Severity Noted Reaction Type Reactions Ace Inhibitors  03/02/2017    Cough Penicillin G  01/20/2010   Side Effect Rash Current Immunizations  Reviewed on 4/13/2018 Name Date Influenza High Dose Vaccine PF 9/12/2016, 9/24/2015, 9/25/2013, 9/25/2013 Influenza Vaccine 9/15/2014 Influenza Vaccine Whole 9/20/2012 Pneumococcal Conjugate (PCV-13) 3/19/2018 Tdap 8/25/2007 ZZZ-RETIRED (DO NOT USE) Pneumococcal Vaccine (Unspecified Type) 2/22/2010 Zoster Vaccine, Live 5/13/2010 Not reviewed this visit You Were Diagnosed With   
  
 Codes Comments Essential hypertension    -  Primary ICD-10-CM: I10 
ICD-9-CM: 401.9 Vitals BP Pulse Temp Resp Height(growth percentile) Weight(growth percentile) 140/74 (BP 1 Location: Left arm, BP Patient Position: Sitting) 80 97.7 °F (36.5 °C) (Oral) 18 5' 3\" (1.6 m) 164 lb (74.4 kg) SpO2 BMI OB Status Smoking Status 97% 29.05 kg/m2 Hysterectomy Never Smoker Vitals History BMI and BSA Data Body Mass Index Body Surface Area 29.05 kg/m 2 1.82 m 2 Preferred Pharmacy Pharmacy Name Phone ON SITE [facility], [address] [place] [address] [facility] Updated Medication List  
  
   
 This list is accurate as of 9/13/18  8:24 AM.  Always use your most recent med list.  
  
  
  
  
 aspirin 81 mg chewable tablet Take 81 mg by mouth daily. atorvastatin 20 mg tablet Commonly known as:  LIPITOR  
TAKE 1 TABLET BY MOUTH ONCE DAILY CALTRATE 600 600 mg calcium (1,500 mg) tablet Take  by mouth two (2) times a day. chlorthalidone 25 mg tablet Commonly known as:  HYGROTEN  
TAKE 1 TABLET BY MOUTH ONCE DAILY FISH OIL 1,000 mg Cap Generic drug:  omega-3 fatty acids-vitamin e Take  by mouth daily. [id number] GLYCN-C PO Take  by mouth daily. metoprolol succinate 50 mg XL tablet Commonly known as:  TOPROL XL Take 1 Tab by mouth daily. multivitamin tablet Commonly known as:  ONE A DAY Take 1 Tab by mouth daily. PREMARIN 0.45 mg tablet Generic drug:  estrogens (conjugated) TAKE ONE TABLET BY MOUTH EVERY DAY  
  
 VITAMIN B-6 100 mg tablet Generic drug:  pyridoxine (vitamin B6) Take 100 mg by mouth daily. VITAMIN D 2,000 unit Cap capsule Generic drug:  Cholecalciferol (Vitamin D3) Take  by mouth daily. Patient Instructions High Blood Pressure: Care Instructions Your Care Instructions If your blood pressure is usually above 130/80, you have high blood pressure, or hypertension. That means the top number is 130 or higher or the bottom number is 80 or higher, or both. Despite what a lot of people think, high blood pressure usually doesn't cause headaches or make you feel dizzy or lightheaded. It usually has no symptoms. But it does increase your risk for heart attack, stroke, and kidney or eye damage. The higher your blood pressure, the more your risk increases. Your doctor will give you a goal for your blood pressure. Your goal will be based on your health and your age.  
Lifestyle changes, such as eating healthy and being active, are always important to help lower blood pressure. You might also take medicine to reach your blood pressure goal. 
Follow-up care is a key part of your treatment and safety. Be sure to make and go to all appointments, and call your doctor if you are having problems. It's also a good idea to know your test results and keep a list of the medicines you take. How can you care for yourself at home? Medical treatment · If you stop taking your medicine, your blood pressure will go back up. You may take one or more types of medicine to lower your blood pressure. Be safe with medicines. Take your medicine exactly as prescribed. Call your doctor if you think you are having a problem with your medicine. · Talk to your doctor before you start taking aspirin every day. Aspirin can help certain people lower their risk of a heart attack or stroke. But taking aspirin isn't right for everyone, because it can cause serious bleeding. · See your doctor regularly. You may need to see the doctor more often at first or until your blood pressure comes down. · If you are taking blood pressure medicine, talk to your doctor before you take decongestants or anti-inflammatory medicine, such as ibuprofen. Some of these medicines can raise blood pressure. · Learn how to check your blood pressure at home. Lifestyle changes · Stay at a healthy weight. This is especially important if you put on weight around the waist. Losing even 10 pounds can help you lower your blood pressure. · If your doctor recommends it, get more exercise. Walking is a good choice. Bit by bit, increase the amount you walk every day. Try for at least 30 minutes on most days of the week. You also may want to swim, bike, or do other activities. · Avoid or limit alcohol. Talk to your doctor about whether you can drink any alcohol. · Try to limit how much sodium you eat to less than 2,300 milligrams (mg) a day. Your doctor may ask you to try to eat less than 1,500 mg a day. · Eat plenty of fruits (such as bananas and oranges), vegetables, legumes, whole grains, and low-fat dairy products. · Lower the amount of saturated fat in your diet. Saturated fat is found in animal products such as milk, cheese, and meat. Limiting these foods may help you lose weight and also lower your risk for heart disease. · Do not smoke. Smoking increases your risk for heart attack and stroke. If you need help quitting, talk to your doctor about stop-smoking programs and medicines. These can increase your chances of quitting for good. When should you call for help? Call 911 anytime you think you may need emergency care. This may mean having symptoms that suggest that your blood pressure is causing a serious heart or blood vessel problem. Your blood pressure may be over 180/110. 
 For example, call 911 if: 
  · You have symptoms of a heart attack. These may include: ¨ Chest pain or pressure, or a strange feeling in the chest. 
¨ Sweating. ¨ Shortness of breath. ¨ Nausea or vomiting. ¨ Pain, pressure, or a strange feeling in the back, neck, jaw, or upper belly or in one or both shoulders or arms. ¨ Lightheadedness or sudden weakness. ¨ A fast or irregular heartbeat.  
  · You have symptoms of a stroke. These may include: 
¨ Sudden numbness, tingling, weakness, or loss of movement in your face, arm, or leg, especially on only one side of your body. ¨ Sudden vision changes. ¨ Sudden trouble speaking. ¨ Sudden confusion or trouble understanding simple statements. ¨ Sudden problems with walking or balance. ¨ A sudden, severe headache that is different from past headaches.  
  · You have severe back or belly pain.  
 Do not wait until your blood pressure comes down on its own. Get help right away. 
 Call your doctor now or seek immediate care if: 
  · Your blood pressure is much higher than normal (such as 180/110 or higher), but you don't have symptoms.   · You think high blood pressure is causing symptoms, such as: ¨ Severe headache. ¨ Blurry vision.  
 Watch closely for changes in your health, and be sure to contact your doctor if: 
  · Your blood pressure measures 140/90 or higher at least 2 times. That means the top number is 140 or higher or the bottom number is 90 or higher, or both.  
  · You think you may be having side effects from your blood pressure medicine.  
  · Your blood pressure is usually normal, but it goes above normal at least 2 times. Where can you learn more? Go to http://aline-lakhwinder.info/. Enter P828 in the search box to learn more about \"High Blood Pressure: Care Instructions. \" Current as of: December 6, 2017 Content Version: 11.7 © 2582-9338 [employer]. Care instructions adapted under license by [employer] (which disclaims liability or warranty for this information). If you have questions about a medical condition or this instruction, always ask your healthcare professional. [address] [address] any warranty or liability for your use of this information. Introducing [facility]! Gudelia Knight introduces [employer] patient portal. Now you can access parts of your medical record, email your doctor's office, and request medication refills online. 1. In your internet browser, go to [email or web address] 2. Click on the First Time User? Click Here link in the Sign In box. You will see the New Member Sign Up page. 3. Enter your [employer] Access Code exactly as it appears below. You will not need to use this code after youve completed the sign-up process. If you do not sign up before the expiration date, you must request a new code. · [employer] Access Code: [email or web address] Expires: 12/12/2018  8:24 AM 
 
4. Enter the last four digits of your Social Security Number (xxxx) and Date of Birth (mm/dd/yyyy) as indicated and click Submit.  You will be taken to the next sign-up page. 5. Create a [employer] ID. This will be your [employer] login ID and cannot be changed, so think of one that is secure and easy to remember. 6. Create a [employer] password. You can change your password at any time. 7. Enter your Password Reset Question and Answer. This can be used at a later time if you forget your password. 8. Enter your e-mail address. You will receive e-mail notification when new information is available in [address] 9. Click Sign Up. You can now view and download portions of your medical record. 10. Click the Download Summary menu link to download a portable copy of your medical information. If you have questions, please visit the Frequently Asked Questions section of the [employer] website. Remember, [employer] is NOT to be used for urgent needs. For medical emergencies, dial 911. Now available from your iPhone and Android! Please provide this summary of care documentation to your next provider. Your primary care clinician is listed as Petr 51. If you have any questions after today's visit, please call [phone].

## 2018-09-13 NOTE — PATIENT INSTRUCTIONS

## 2018-09-17 ENCOUNTER — CLINICAL SUPPORT (OUTPATIENT)
Dept: FAMILY MEDICINE CLINIC | Age: 73
End: 2018-09-17

## 2018-09-17 DIAGNOSIS — Z23 ENCOUNTER FOR IMMUNIZATION: Primary | ICD-10-CM

## 2018-09-17 NOTE — PROGRESS NOTES
Usha Velasquez is a 75 y.o. female presented in clinic today for   Chief Complaint   Patient presents with    Immunization/Injection     TDap

## 2018-09-27 ENCOUNTER — TELEPHONE (OUTPATIENT)
Dept: FAMILY MEDICINE CLINIC | Age: 73
End: 2018-09-27

## 2018-09-27 DIAGNOSIS — N63.10 BREAST MASS, RIGHT: Primary | ICD-10-CM

## 2018-09-27 NOTE — TELEPHONE ENCOUNTER
Pt called asking for her mammo and ultrasound orders. Per pt she will have this at [facility] in November. They are asking for a bilateral dx mammo and R ultrasound with dx N64.9.

## 2018-10-22 DIAGNOSIS — Z78.0 POSTMENOPAUSAL: ICD-10-CM

## 2018-10-22 RX ORDER — ESTROGENS, CONJUGATED 0.45 MG/1
TABLET, FILM COATED ORAL
Qty: 90 TAB | Refills: 1 | Status: SHIPPED | OUTPATIENT
Start: 2018-10-22 | End: 2018-10-23 | Stop reason: SDUPTHER

## 2018-10-22 RX ORDER — ATORVASTATIN CALCIUM 20 MG/1
TABLET, FILM COATED ORAL
Qty: 90 TAB | Refills: 1 | Status: SHIPPED | OUTPATIENT
Start: 2018-10-22 | End: 2019-04-20 | Stop reason: SDUPTHER

## 2018-10-23 DIAGNOSIS — Z78.0 POSTMENOPAUSAL: ICD-10-CM

## 2018-11-06 DIAGNOSIS — I10 ESSENTIAL HYPERTENSION: ICD-10-CM

## 2018-11-06 RX ORDER — METOPROLOL SUCCINATE 50 MG/1
TABLET, EXTENDED RELEASE ORAL
Qty: 90 TAB | Refills: 1 | Status: SHIPPED | OUTPATIENT
Start: 2018-11-06 | End: 2019-05-06 | Stop reason: SDUPTHER

## 2018-11-06 RX ORDER — CHLORTHALIDONE 25 MG/1
TABLET ORAL
Qty: 90 TAB | Refills: 1 | Status: SHIPPED | OUTPATIENT
Start: 2018-11-06 | End: 2019-05-06 | Stop reason: SDUPTHER

## 2018-11-29 DIAGNOSIS — N63.10 BREAST MASS, RIGHT: ICD-10-CM

## 2019-03-07 ENCOUNTER — HOSPITAL ENCOUNTER (OUTPATIENT)
Dept: LAB | Age: 74
Discharge: HOME | End: 2019-03-07
Payer: MEDICARE

## 2019-03-07 DIAGNOSIS — E78.00 PURE HYPERCHOLESTEROLEMIA: ICD-10-CM

## 2019-03-07 LAB
ALBUMIN SERPL-MCNC: 3.6 G/DL (ref 3.4–5)
ALBUMIN/GLOB SERPL: 1.2 {RATIO} (ref 0.8–1.7)
ALP SERPL-CCNC: 73 U/L (ref 45–117)
ALT SERPL-CCNC: 33 U/L (ref 13–56)
ANION GAP SERPL CALC-SCNC: 7 MMOL/L (ref 3–18)
APPEARANCE UR: ABNORMAL
AST SERPL-CCNC: 25 U/L (ref 15–37)
BACTERIA URNS QL MICRO: ABNORMAL /HPF
BASOPHILS # BLD: 0 K/UL (ref 0–0.1)
BASOPHILS NFR BLD: 0 % (ref 0–2)
BILIRUB DIRECT SERPL-MCNC: 0.1 MG/DL (ref 0–0.2)
BILIRUB SERPL-MCNC: 0.4 MG/DL (ref 0.2–1)
BILIRUB UR QL: NEGATIVE
BUN SERPL-MCNC: 21 MG/DL (ref 7–18)
BUN/CREAT SERPL: 33 (ref 12–20)
CALCIUM SERPL-MCNC: 9 MG/DL (ref 8.5–10.1)
CHLORIDE SERPL-SCNC: 102 MMOL/L (ref 100–108)
CHOLEST SERPL-MCNC: 164 MG/DL
CO2 SERPL-SCNC: 31 MMOL/L (ref 21–32)
COLOR UR: YELLOW
CREAT SERPL-MCNC: 0.64 MG/DL (ref 0.6–1.3)
DIFFERENTIAL METHOD BLD: NORMAL
EOSINOPHIL # BLD: 0.1 K/UL (ref 0–0.4)
EOSINOPHIL NFR BLD: 2 % (ref 0–5)
EPITH CASTS URNS QL MICRO: ABNORMAL /LPF (ref 0–5)
ERYTHROCYTE [DISTWIDTH] IN BLOOD BY AUTOMATED COUNT: 13.5 % (ref 11.6–14.5)
GLOBULIN SER CALC-MCNC: 3 G/DL (ref 2–4)
GLUCOSE SERPL-MCNC: 105 MG/DL (ref 74–99)
GLUCOSE UR STRIP.AUTO-MCNC: NEGATIVE MG/DL
HCT VFR BLD AUTO: 40.3 % (ref 35–45)
HDLC SERPL-MCNC: 82 MG/DL (ref 40–60)
HDLC SERPL: 2 {RATIO} (ref 0–5)
HGB BLD-MCNC: 12.9 G/DL (ref 12–16)
HGB UR QL STRIP: ABNORMAL
KETONES UR QL STRIP.AUTO: NEGATIVE MG/DL
LDLC SERPL CALC-MCNC: 46.6 MG/DL (ref 0–100)
LEUKOCYTE ESTERASE UR QL STRIP.AUTO: ABNORMAL
LIPID PROFILE,FLP: ABNORMAL
LYMPHOCYTES # BLD: 1.6 K/UL (ref 0.9–3.6)
LYMPHOCYTES NFR BLD: 30 % (ref 21–52)
MCH RBC QN AUTO: 29.4 PG (ref 24–34)
MCHC RBC AUTO-ENTMCNC: 32 G/DL (ref 31–37)
MCV RBC AUTO: 91.8 FL (ref 74–97)
MONOCYTES # BLD: 0.5 K/UL (ref 0.05–1.2)
MONOCYTES NFR BLD: 9 % (ref 3–10)
NEUTS SEG # BLD: 3.2 K/UL (ref 1.8–8)
NEUTS SEG NFR BLD: 59 % (ref 40–73)
NITRITE UR QL STRIP.AUTO: NEGATIVE
PH UR STRIP: 5.5 [PH] (ref 5–8)
PLATELET # BLD AUTO: 226 K/UL (ref 135–420)
PMV BLD AUTO: 11.5 FL (ref 9.2–11.8)
POTASSIUM SERPL-SCNC: 3.9 MMOL/L (ref 3.5–5.5)
PROT SERPL-MCNC: 6.6 G/DL (ref 6.4–8.2)
PROT UR STRIP-MCNC: NEGATIVE MG/DL
RBC # BLD AUTO: 4.39 M/UL (ref 4.2–5.3)
RBC #/AREA URNS HPF: ABNORMAL /HPF (ref 0–5)
SODIUM SERPL-SCNC: 140 MMOL/L (ref 136–145)
SP GR UR REFRACTOMETRY: 1.02 (ref 1–1.03)
T4 FREE SERPL-MCNC: 1 NG/DL (ref 0.7–1.5)
TRIGL SERPL-MCNC: 177 MG/DL (ref ?–150)
TSH SERPL DL<=0.05 MIU/L-ACNC: 2.5 UIU/ML (ref 0.36–3.74)
UROBILINOGEN UR QL STRIP.AUTO: 0.2 EU/DL (ref 0.2–1)
VLDLC SERPL CALC-MCNC: 35.4 MG/DL
WBC # BLD AUTO: 5.4 K/UL (ref 4.6–13.2)
WBC URNS QL MICRO: ABNORMAL /HPF (ref 0–4)

## 2019-03-07 PROCEDURE — 36415 COLL VENOUS BLD VENIPUNCTURE: CPT

## 2019-03-07 PROCEDURE — 80048 BASIC METABOLIC PNL TOTAL CA: CPT

## 2019-03-07 PROCEDURE — 80061 LIPID PANEL: CPT

## 2019-03-07 PROCEDURE — 84439 ASSAY OF FREE THYROXINE: CPT

## 2019-03-07 PROCEDURE — 85025 COMPLETE CBC W/AUTO DIFF WBC: CPT

## 2019-03-07 PROCEDURE — 84443 ASSAY THYROID STIM HORMONE: CPT

## 2019-03-07 PROCEDURE — 80076 HEPATIC FUNCTION PANEL: CPT

## 2019-03-07 PROCEDURE — 81001 URINALYSIS AUTO W/SCOPE: CPT

## 2019-03-14 ENCOUNTER — OFFICE VISIT (OUTPATIENT)
Dept: FAMILY MEDICINE CLINIC | Age: 74
End: 2019-03-14

## 2019-03-14 VITALS
OXYGEN SATURATION: 96 % | DIASTOLIC BLOOD PRESSURE: 82 MMHG | WEIGHT: 166 LBS | SYSTOLIC BLOOD PRESSURE: 134 MMHG | HEIGHT: 63 IN | RESPIRATION RATE: 18 BRPM | BODY MASS INDEX: 29.41 KG/M2 | TEMPERATURE: 97.8 F | HEART RATE: 74 BPM

## 2019-03-14 DIAGNOSIS — I10 ESSENTIAL HYPERTENSION: Primary | ICD-10-CM

## 2019-03-14 DIAGNOSIS — Z00.00 MEDICARE ANNUAL WELLNESS VISIT, SUBSEQUENT: ICD-10-CM

## 2019-03-14 DIAGNOSIS — E78.00 PURE HYPERCHOLESTEROLEMIA: ICD-10-CM

## 2019-03-14 NOTE — PROGRESS NOTES
Assessment/Plan:    *Diagnoses and all orders for this visit:    1. Essential hypertension    2. Medicare annual wellness visit, subsequent    3. Pure hypercholesterolemia        Routine f/u 6 months. The plan was discussed with the patient. The patient verbalized understanding and is in agreement with the plan. All medication potential side effects were discussed with the patient.    -------------------------------------------------------------------------------------------------------------------        Camilla Foster is a 75 y.o. female and presents with Annual Wellness Visit         Subjective:  Pt here for f/u. Has no complaints at all. Excited b/c youngest daughter is about to have her first child. HTN:  Well controlled. Reviewed her BP log from home, is the same as here. HLD: well controlled. ROS:  Review of Systems - Negative         The problem list was updated as a part of today's visit. Patient Active Problem List   Diagnosis Code    Postmenopausal Z78.0    Hyperlipidemia E78.5    Advance care planning Z71.89    Essential hypertension I10       The PSH, FH were reviewed. SH:  Social History     Tobacco Use    Smoking status: Never Smoker    Smokeless tobacco: Never Used   Substance Use Topics    Alcohol use: Yes     Alcohol/week: 1.0 oz     Types: 2 Glasses of wine per week    Drug use: No       Medications/Allergies:  Current Outpatient Medications on File Prior to Visit   Medication Sig Dispense Refill    chlorthalidone (HYGROTEN) 25 mg tablet TAKE ONE TABLET BY MOUTH EVERY DAY 90 Tab 1    metoprolol succinate (TOPROL-XL) 50 mg XL tablet TAKE ONE TABLET BY MOUTH EVERY DAY 90 Tab 1    estrogens, conjugated, (PREMARIN) 0.45 mg tablet TAKE ONE TABLET BY MOUTH EVERY DAY 90 Tab 1    atorvastatin (LIPITOR) 20 mg tablet TAKE 1 TABLET BY MOUTH ONCE DAILY 90 Tab 1    Cholecalciferol, Vitamin D3, (VITAMIN D) 2,000 unit Cap Take  by mouth daily.       aspirin 81 mg chewable tablet Take 81 mg by mouth daily.  calcium carbonate (CALTRATE 600) 1,500 (600) mg Tab Take  by mouth two (2) times a day.  GLUC HCL/CSANA/GLY-AM-GLY,MX/C ([id number] GLYCN-C PO) Take  by mouth daily. No current facility-administered medications on file prior to visit. Allergies   Allergen Reactions    Ace Inhibitors Cough    Penicillin G Rash         Health Maintenance:   Health Maintenance   Topic Date Due    Shingrix Vaccine Age 49> (1 of 2) 08/05/1994    MEDICARE YEARLY EXAM  03/16/2019    BREAST CANCER SCRN MAMMOGRAM  11/14/2019    GLAUCOMA SCREENING Q2Y  02/09/2020    COLONOSCOPY  08/12/2024    DTaP/Tdap/Td series (3 - Td) 09/17/2028    Bone Densitometry (Dexa) Screening  Completed    Pneumococcal 65+ Low/Medium Risk  Completed    Influenza Age 5 to Adult  Completed       Objective:  Visit Vitals  /82 (BP 1 Location: Left arm, BP Patient Position: Sitting)   Pulse 74   Temp 97.8 °F (36.6 °C) (Oral)   Resp 18   Ht 5' 3\" (1.6 m)   Wt 166 lb (75.3 kg)   SpO2 96%   BMI 29.41 kg/m²          Nurses notes and VS reviewed.       Physical Examination: General appearance - alert, well appearing, and in no distress  Ears - bilateral TM's and external ear canals normal  Mouth - mucous membranes moist, pharynx normal without lesions  Neck - supple, no significant adenopathy  Chest - clear to auscultation, no wheezes, rales or rhonchi, symmetric air entry  Heart - normal rate, regular rhythm, normal S1, S2, no murmurs, rubs, clicks or gallops  Abdomen - soft, nontender, nondistended, no masses or organomegaly  Musculoskeletal - no joint tenderness, deformity or swelling  Extremities - peripheral pulses normal, no pedal edema, no clubbing or cyanosis        Labwork and Ancillary Studies:    CBC w/Diff  Lab Results   Component Value Date/Time    WBC 5.4 03/07/2019 08:26 AM    HGB 12.9 03/07/2019 08:26 AM    PLATELET 864 64/64/3751 08:26 AM         Basic Metabolic Profile  Lab Results   Component Value Date/Time    Sodium 140 03/07/2019 08:26 AM    Potassium 3.9 03/07/2019 08:26 AM    Chloride 102 03/07/2019 08:26 AM    CO2 31 03/07/2019 08:26 AM    Anion gap 7 03/07/2019 08:26 AM    Glucose 105 (H) 03/07/2019 08:26 AM    BUN 21 (H) 03/07/2019 08:26 AM    Creatinine 0.64 03/07/2019 08:26 AM    BUN/Creatinine ratio 33 (H) 03/07/2019 08:26 AM    GFR est AA >60 03/07/2019 08:26 AM    GFR est non-AA >60 03/07/2019 08:26 AM    Calcium 9.0 03/07/2019 08:26 AM         LFT  Lab Results   Component Value Date/Time    ALT (SGPT) 33 03/07/2019 08:26 AM    AST (SGOT) 25 03/07/2019 08:26 AM    Alk.  phosphatase 73 03/07/2019 08:26 AM    Bilirubin, direct 0.1 03/07/2019 08:26 AM    Bilirubin, total 0.4 03/07/2019 08:26 AM         Cholesterol  Lab Results   Component Value Date/Time    Cholesterol, total 164 03/07/2019 08:26 AM    HDL Cholesterol 82 (H) 03/07/2019 08:26 AM    LDL, calculated 46.6 03/07/2019 08:26 AM    Triglyceride 177 (H) 03/07/2019 08:26 AM    CHOL/HDL Ratio 2.0 03/07/2019 08:26 AM

## 2019-03-14 NOTE — PROGRESS NOTES
This is the Subsequent Medicare Annual Wellness Exam, performed 12 months or more after the Initial AWV or the last Subsequent AWV    I have reviewed the patient's medical history in detail and updated the computerized patient record. History     Past Medical History:   Diagnosis Date    Advance care planning 3/7/2016    Discussed with pt. She has one and will bring us a copy.  Essential hypertension 3/7/2016    Hypercholesterolemia     Hyperlipemia 1/20/2010    Hyperlipidemia 6/29/2012    Postmenopausal 1/20/2010      Past Surgical History:   Procedure Laterality Date    HX HYSTERECTOMY  1993    HX LUMBAR DISKECTOMY  1992    HX TONSILLECTOMY      OSTEOTOMY [address],<[address]    osteototomy leg open     Current Outpatient Medications   Medication Sig Dispense Refill    chlorthalidone (HYGROTEN) 25 mg tablet TAKE ONE TABLET BY MOUTH EVERY DAY 90 Tab 1    metoprolol succinate (TOPROL-XL) 50 mg XL tablet TAKE ONE TABLET BY MOUTH EVERY DAY 90 Tab 1    estrogens, conjugated, (PREMARIN) 0.45 mg tablet TAKE ONE TABLET BY MOUTH EVERY DAY 90 Tab 1    atorvastatin (LIPITOR) 20 mg tablet TAKE 1 TABLET BY MOUTH ONCE DAILY 90 Tab 1    Cholecalciferol, Vitamin D3, (VITAMIN D) 2,000 unit Cap Take  by mouth daily.  aspirin 81 mg chewable tablet Take 81 mg by mouth daily.  calcium carbonate (CALTRATE 600) 1,500 (600) mg Tab Take  by mouth two (2) times a day.  GLUC HCL/CSANA/GLY-AM-GLY,MX/C (AQSXFHLC-GBXBVVZBGF-XW GLYCN-C PO) Take  by mouth daily. Allergies   Allergen Reactions    Ace Inhibitors Cough    Penicillin G Rash     Family History   Problem Relation Age of Onset    Diabetes Mother     Hypertension Mother     Diabetes Father     Heart Disease Father      Social History     Tobacco Use    Smoking status: Never Smoker    Smokeless tobacco: Never Used   Substance Use Topics    Alcohol use:  Yes     Alcohol/week: 1.0 oz     Types: 2 Glasses of wine per week Patient Active Problem List   Diagnosis Code    Postmenopausal Z78.0    Hyperlipidemia E78.5    Advance care planning Z71.89    Essential hypertension I10       Depression Risk Factor Screening:     3 most recent PHQ Screens 3/14/2019   Little interest or pleasure in doing things Not at all   Feeling down, depressed, irritable, or hopeless Not at all   Total Score PHQ 2 0     Alcohol Risk Factor Screening: You do not drink alcohol or very rarely. Functional Ability and Level of Safety:   Hearing Loss  Hearing is good. Activities of Daily Living  The home contains: no safety equipment. Patient does total self care    Fall Risk  Fall Risk Assessment, last 12 mths 3/14/2019   Able to walk? Yes   Fall in past 12 months? No   Fall with injury? -   Number of falls in past 12 months -   Fall Risk Score -       Abuse Screen  Patient is not abused    Cognitive Screening   Evaluation of Cognitive Function:  Has your family/caregiver stated any concerns about your memory: no  Normal    Patient Care Team   Patient Care Team:  Easton Alvarado MD as PCP - General (Internal Medicine)  Rhett Keith MD (Ophthalmology)    Assessment/Plan   Education and counseling provided:  Are appropriate based on today's review and evaluation    Diagnoses and all orders for this visit:    1. Medicare annual wellness visit, subsequent    2. Essential hypertension    3.  Pure hypercholesterolemia        Health Maintenance Due   Topic Date Due    Shingrix Vaccine Age 49> (1 of 2) 08/05/1994    MEDICARE YEARLY EXAM  03/16/2019

## 2019-03-14 NOTE — PROGRESS NOTES
Denver Maus is a 75 y.o. female (: 1945) presenting to address:    Chief Complaint   Patient presents with    Annual Wellness Visit       Vitals:    19 0757   BP: 134/82   Pulse: 74   Resp: 18   Temp: 97.8 °F (36.6 °C)   TempSrc: Oral   SpO2: 96%   Weight: 166 lb (75.3 kg)   Height: 5' 3\" (1.6 m)   PainSc:   0 - No pain       Hearing/Vision:      Visual Acuity Screening    Right eye Left eye Both eyes   Without correction:      With correction: 20/20 20/20 20/20       Learning Assessment:     Learning Assessment 3/25/2015   PRIMARY LEARNER Patient   HIGHEST LEVEL OF EDUCATION - PRIMARY LEARNER  > 4 YEARS OF COLLEGE   BARRIERS PRIMARY LEARNER NONE   CO-LEARNER CAREGIVER No   PRIMARY LANGUAGE ENGLISH   LEARNER PREFERENCE PRIMARY DEMONSTRATION   ANSWERED BY self   RELATIONSHIP SELF     Depression Screening:     3 most recent PHQ Screens 3/14/2019   Little interest or pleasure in doing things Not at all   Feeling down, depressed, irritable, or hopeless Not at all   Total Score PHQ 2 0     Fall Risk Assessment:     Fall Risk Assessment, last 12 mths 3/14/2019   Able to walk? Yes   Fall in past 12 months? No   Fall with injury? -   Number of falls in past 12 months -   Fall Risk Score -     Abuse Screening:     Abuse Screening Questionnaire 3/15/2018   Do you ever feel afraid of your partner? N   Are you in a relationship with someone who physically or mentally threatens you? N   Is it safe for you to go home? Y     Coordination of Care Questionaire:   1. Have you been to the ER, urgent care clinic since your last visit? Hospitalized since your last visit? NO    2. Have you seen or consulted any other health care providers outside of the [address] [place] [address] since your last visit? Include any pap smears or colon screening. YES Dr. Heladio Irene    Advanced Directive:   1. Do you have an Advanced Directive? YES    2. Would you like information on Advanced Directives?  NO

## 2019-03-14 NOTE — PATIENT INSTRUCTIONS
Medicare Wellness Visit, Female     The best way to live healthy is to have a lifestyle where you eat a well-balanced diet, exercise regularly, limit alcohol use, and quit all forms of tobacco/nicotine, if applicable. Regular preventive services are another way to keep healthy. Preventive services (vaccines, screening tests, monitoring & exams) can help personalize your care plan, which helps you manage your own care. Screening tests can find health problems at the earliest stages, when they are easiest to treat. [facility] follows the current, evidence-based guidelines published by the [facility] Oleg Trina ([facility]) when recommending preventive services for our patients. Because we follow these guidelines, sometimes recommendations change over time as research supports it. (For example, mammograms used to be recommended annually. Even though Medicare will still pay for an annual mammogram, the newer guidelines recommend a mammogram every two years for women of average risk.)  Of course, you and your doctor may decide to screen more often for some diseases, based on your risk and your health status. Preventive services for you include:  - Medicare offers their members a free annual wellness visit, which is time for you and your primary care provider to discuss and plan for your preventive service needs. Take advantage of this benefit every year!  -All adults over the age of 72 should receive the recommended pneumonia vaccines. Current USPSTF guidelines recommend a series of two vaccines for the best pneumonia protection.   -All adults should have a flu vaccine yearly and a tetanus vaccine every 10 years. All adults age 61 and older should receive a shingles vaccine once in their lifetime.    -A bone mass density test is recommended when a woman turns 65 to screen for osteoporosis. This test is only recommended one time, as a screening.  Some providers will use this same test as a disease monitoring tool if you already have osteoporosis. -All adults age 38-68 who are overweight should have a diabetes screening test once every three years.   -Other screening tests and preventive services for persons with diabetes include: an eye exam to screen for diabetic retinopathy, a kidney function test, a foot exam, and stricter control over your cholesterol.   -Cardiovascular screening for adults with routine risk involves an electrocardiogram (ECG) at intervals determined by your doctor.   -Colorectal cancer screenings should be done for adults age 54-65 with no increased risk factors for colorectal cancer. There are a number of acceptable methods of screening for this type of cancer. Each test has its own benefits and drawbacks. Discuss with your doctor what is most appropriate for you during your annual wellness visit. The different tests include: colonoscopy (considered the best screening method), a fecal occult blood test, a fecal DNA test, and sigmoidoscopy. -Breast cancer screenings are recommended every other year for women of normal risk, age 54-69.  -Cervical cancer screenings for women over age 72 are only recommended with certain risk factors.   -All adults born between [facility] should be screened once for Hepatitis C.      Here is a list of your current Health Maintenance items (your personalized list of preventive services) with a due date:  Health Maintenance Due   Topic Date Due    Shingles Vaccine (1 of 2) 08/05/1994    Annual Well Visit  03/16/2019

## 2019-04-20 DIAGNOSIS — Z78.0 POSTMENOPAUSAL: ICD-10-CM

## 2019-04-22 RX ORDER — ATORVASTATIN CALCIUM 20 MG/1
TABLET, FILM COATED ORAL
Qty: 90 TAB | Refills: 1 | Status: SHIPPED | OUTPATIENT
Start: 2019-04-22 | End: 2019-10-28 | Stop reason: SDUPTHER

## 2019-05-06 DIAGNOSIS — I10 ESSENTIAL HYPERTENSION: ICD-10-CM

## 2019-05-06 RX ORDER — METOPROLOL SUCCINATE 50 MG/1
TABLET, EXTENDED RELEASE ORAL
Qty: 90 TAB | Refills: 1 | Status: SHIPPED | OUTPATIENT
Start: 2019-05-06 | End: 2019-09-19

## 2019-05-06 RX ORDER — CHLORTHALIDONE 25 MG/1
TABLET ORAL
Qty: 90 TAB | Refills: 1 | Status: SHIPPED | OUTPATIENT
Start: 2019-05-06 | End: 2019-11-12 | Stop reason: SDUPTHER

## 2019-07-24 ENCOUNTER — TELEPHONE (OUTPATIENT)
Dept: FAMILY MEDICINE CLINIC | Age: 74
End: 2019-07-24

## 2019-07-24 NOTE — TELEPHONE ENCOUNTER
Submitted prior authorization to covermymeds. For Premarin 0.45 mg. Key# [id number]    Per covermymeds. Approvedtoday   Questionnaire submitted. PA Case [id number] Status: Approved. Authorization and Notifications Completed.

## 2019-08-09 NOTE — MR AVS SNAPSHOT
Visit Information Date & Time Provider Department Dept. Phone Encounter #  
 3/27/2017  8:45 AM Torrey KennedyRadha zhang [place] [phone] [id number] Upcoming Health Maintenance Date Due Pneumococcal 65+ Low/Medium Risk (2 of 2 - PPSV23) 2/22/2015 DTaP/Tdap/Td series (2 - Td) 8/25/2017 GLAUCOMA SCREENING Q2Y 10/23/2017 MEDICARE YEARLY EXAM 3/3/2018 BREAST CANCER SCRN MAMMOGRAM 4/21/2018 COLONOSCOPY 8/12/2024 Allergies as of 3/27/2017  Review Complete On: 3/27/2017 By: Torrey Kennedy MD  
  
 Severity Noted Reaction Type Reactions Ace Inhibitors  03/02/2017    Cough Penicillin G  01/20/2010   Side Effect Rash Current Immunizations  Reviewed on 3/2/2017 Name Date Influenza High Dose Vaccine PF 9/12/2016, 9/24/2015, 9/25/2013, 9/25/2013 Influenza Vaccine 9/15/2014 Influenza Vaccine Whole 9/20/2012 Pneumococcal Vaccine (Unspecified Type) 2/22/2010 Tdap 8/25/2007 Zoster Vaccine, Live 5/13/2010 Not reviewed this visit You Were Diagnosed With   
  
 Codes Comments Essential hypertension    -  Primary ICD-10-CM: I10 
ICD-9-CM: 401.9 Vitals BP Pulse Temp Resp Height(growth percentile) Weight(growth percentile) 148/78 71 97.5 °F (36.4 °C) 16 5' 3\" (1.6 m) 161 lb (73 kg) SpO2 BMI OB Status Smoking Status 96% 28.52 kg/m2 Hysterectomy Never Smoker Vitals History BMI and BSA Data Body Mass Index Body Surface Area 28.52 kg/m 2 1.8 m 2 Preferred Pharmacy Pharmacy Name Phone ON [address] Your Updated Medication List  
  
   
This list is accurate as of: 3/27/17  8:52 AM.  Always use your most recent med list.  
  
  
  
  
 aspirin 81 mg chewable tablet Take 81 mg by mouth daily. atorvastatin 20 mg tablet Commonly known as:  LIPITOR  
TAKE ONE TABLET BY MOUTH EVERY DAY  
  
 CALTRATE 600 600 mg calcium (1,500 mg) tablet Take  by mouth two (2) times a day. chlorthalidone 25 mg tablet Commonly known as:  Riley Rao Take 1 Tab by mouth daily. FISH OIL 1,000 mg Cap Generic drug:  omega-3 fatty acids-vitamin e Take  by mouth daily. AHXLFQXF-QVDRWWKDSD-OT GLYCN-C PO Take  by mouth daily. metoprolol succinate 25 mg XL tablet Commonly known as:  TOPROL-XL Take 1 Tab by mouth daily. multivitamin tablet Commonly known as:  ONE A DAY Take 1 Tab by mouth daily. PREMARIN 0.45 mg tablet Generic drug:  estrogens (conjugated) TAKE ONE TABLET BY MOUTH EVERY DAY  
  
 VITAMIN B-6 100 mg tablet Generic drug:  pyridoxine (vitamin B6) Take 100 mg by mouth daily. VITAMIN D 2,000 unit Cap capsule Generic drug:  Cholecalciferol (Vitamin D3) Take  by mouth daily. Prescriptions Sent to Pharmacy Refills  
 metoprolol succinate (TOPROL-XL) 25 mg XL tablet 1 Sig: Take 1 Tab by mouth daily. Class: Normal  
 Pharmacy: On [address] #: [phone] Route: Oral  
  
Patient Instructions High Blood Pressure: Care Instructions Your Care Instructions If your blood pressure is usually above 140/90, you have high blood pressure, or hypertension. That means the top number is 140 or higher or the bottom number is 90 or higher, or both. Despite what a lot of people think, high blood pressure usually doesn't cause headaches or make you feel dizzy or lightheaded. It usually has no symptoms. But it does increase your risk for heart attack, stroke, and kidney or eye damage. The higher your blood pressure, the more your risk increases. Your doctor will give you a goal for your blood pressure. Your goal will be based on your health and your age. An example of a goal is to keep your blood pressure below 140/90. Lifestyle changes, such as eating healthy and being active, are always important to help lower blood pressure. You might also take medicine to reach your blood pressure goal. 
Follow-up care is a key part of your treatment and safety. Be sure to make and go to all appointments, and call your doctor if you are having problems. It's also a good idea to know your test results and keep a list of the medicines you take. How can you care for yourself at home? Medical treatment · If you stop taking your medicine, your blood pressure will go back up. You may take one or more types of medicine to lower your blood pressure. Be safe with medicines. Take your medicine exactly as prescribed. Call your doctor if you think you are having a problem with your medicine. · Talk to your doctor before you start taking aspirin every day. Aspirin can help certain people lower their risk of a heart attack or stroke. But taking aspirin isn't right for everyone, because it can cause serious bleeding. · See your doctor regularly. You may need to see the doctor more often at first or until your blood pressure comes down. · If you are taking blood pressure medicine, talk to your doctor before you take decongestants or anti-inflammatory medicine, such as ibuprofen. Some of these medicines can raise blood pressure. · Learn how to check your blood pressure at home. Lifestyle changes · Stay at a healthy weight. This is especially important if you put on weight around the waist. Losing even 10 pounds can help you lower your blood pressure. · If your doctor recommends it, get more exercise. Walking is a good choice. Bit by bit, increase the amount you walk every day. Try for at least 30 minutes on most days of the week. You also may want to swim, bike, or do other activities. · Avoid or limit alcohol. Talk to your doctor about whether you can drink any alcohol.  
· Try to limit how much sodium you eat to less than 2,300 milligrams (mg) a day. Your doctor may ask you to try to eat less than 1,500 mg a day. · Eat plenty of fruits (such as bananas and oranges), vegetables, legumes, whole grains, and low-fat dairy products. · Lower the amount of saturated fat in your diet. Saturated fat is found in animal products such as milk, cheese, and meat. Limiting these foods may help you lose weight and also lower your risk for heart disease. · Do not smoke. Smoking increases your risk for heart attack and stroke. If you need help quitting, talk to your doctor about stop-smoking programs and medicines. These can increase your chances of quitting for good. When should you call for help? Call 911 anytime you think you may need emergency care. This may mean having symptoms that suggest that your blood pressure is causing a serious heart or blood vessel problem. Your blood pressure may be over 180/110. For example, call 911 if: 
· You have symptoms of a heart attack. These may include: ¨ Chest pain or pressure, or a strange feeling in the chest. 
¨ Sweating. ¨ Shortness of breath. ¨ Nausea or vomiting. ¨ Pain, pressure, or a strange feeling in the back, neck, jaw, or upper belly or in one or both shoulders or arms. ¨ Lightheadedness or sudden weakness. ¨ A fast or irregular heartbeat. · You have symptoms of a stroke. These may include: 
¨ Sudden numbness, tingling, weakness, or loss of movement in your face, arm, or leg, especially on only one side of your body. ¨ Sudden vision changes. ¨ Sudden trouble speaking. ¨ Sudden confusion or trouble understanding simple statements. ¨ Sudden problems with walking or balance. ¨ A sudden, severe headache that is different from past headaches. · You have severe back or belly pain. Do not wait until your blood pressure comes down on its own. Get help right away.  
Call your doctor now or seek immediate care if: 
· Your blood pressure is much higher than normal (such as 180/110 or higher), but you don't have symptoms. · You think high blood pressure is causing symptoms, such as: ¨ Severe headache. ¨ Blurry vision. Watch closely for changes in your health, and be sure to contact your doctor if: 
· Your blood pressure measures 140/90 or higher at least 2 times. That means the top number is 140 or higher or the bottom number is 90 or higher, or both. · You think you may be having side effects from your blood pressure medicine. · Your blood pressure is usually normal, but it goes above normal at least 2 times. Where can you learn more? Go to http://aline-lakhwinder.info/. Enter M770 in the search box to learn more about \"High Blood Pressure: Care Instructions. \" Current as of: August 8, 2016 Content Version: 11.1 © 3585-1437 [employer]. Care instructions adapted under license by [employer] (which disclaims liability or warranty for this information). If you have questions about a medical condition or this instruction, always ask your healthcare professional. [place] [address] any warranty or liability for your use of this information. Introducing [facility]! [facility] introduces [employer] patient portal. Now you can access parts of your medical record, email your doctor's office, and request medication refills online. 1. In your internet browser, go to [email or web address] 2. Click on the First Time User? Click Here link in the Sign In box. You will see the New Member Sign Up page. 3. Enter your [employer] Access Code exactly as it appears below. You will not need to use this code after youve completed the sign-up process. If you do not sign up before the expiration date, you must request a new code. · [employer] Access Code: [email or web address] Expires: 6/25/2017  8:52 AM 
 
4.  Enter the last four digits of your Social Security Number (xxxx) and Date of Birth (mm/dd/yyyy) as indicated and click Submit. You will be taken to the next sign-up page. 5. Create a [employer] ID. This will be your [employer] login ID and cannot be changed, so think of one that is secure and easy to remember. 6. Create a [employer] password. You can change your password at any time. 7. Enter your Password Reset Question and Answer. This can be used at a later time if you forget your password. 8. Enter your e-mail address. You will receive e-mail notification when new information is available in [address] 9. Click Sign Up. You can now view and download portions of your medical record. 10. Click the Download Summary menu link to download a portable copy of your medical information. If you have questions, please visit the Frequently Asked Questions section of the [employer] website. Remember, [employer] is NOT to be used for urgent needs. For medical emergencies, dial 911. Now available from your iPhone and Android! Please provide this summary of care documentation to your next provider. Your primary care clinician is listed as Petr 51. If you have any questions after today's visit, please call [phone]. nurses notes/vital signs

## 2019-09-19 ENCOUNTER — OFFICE VISIT (OUTPATIENT)
Dept: FAMILY MEDICINE CLINIC | Age: 74
End: 2019-09-19

## 2019-09-19 VITALS
DIASTOLIC BLOOD PRESSURE: 70 MMHG | BODY MASS INDEX: 29.41 KG/M2 | OXYGEN SATURATION: 98 % | HEART RATE: 71 BPM | HEIGHT: 63 IN | SYSTOLIC BLOOD PRESSURE: 142 MMHG | RESPIRATION RATE: 16 BRPM | WEIGHT: 166 LBS | TEMPERATURE: 95.6 F

## 2019-09-19 DIAGNOSIS — J98.01 BRONCHOSPASM: ICD-10-CM

## 2019-09-19 DIAGNOSIS — Z23 ENCOUNTER FOR IMMUNIZATION: ICD-10-CM

## 2019-09-19 DIAGNOSIS — R05.9 COUGH: ICD-10-CM

## 2019-09-19 DIAGNOSIS — I10 ESSENTIAL HYPERTENSION: Primary | ICD-10-CM

## 2019-09-19 RX ORDER — DILTIAZEM HYDROCHLORIDE 180 MG/1
180 CAPSULE, COATED, EXTENDED RELEASE ORAL DAILY
Qty: 30 CAP | Refills: 1 | Status: SHIPPED | OUTPATIENT
Start: 2019-09-19 | End: 2019-11-12 | Stop reason: SDUPTHER

## 2019-09-19 NOTE — PROGRESS NOTES
Assessment/Plan:    *Diagnoses and all orders for this visit:    1. Essential hypertension  -     dilTIAZem CD (CARDIZEM CD) 180 mg ER capsule; Take 1 Cap by mouth daily. 2. Cough  -     REFERRAL TO ENT-OTOLARYNGOLOGY    3. Bronchospasm    4. Encounter for immunization  -     INFLUENZA VACCINE INACTIVATED (IIV), SUBUNIT, ADJUVANTED, IM  -     ADMIN INFLUENZA VIRUS VAC        F/u 4 weeks for change in BP meds. Cont chlorthalidone, stop Toprol and add Diltiazem. The plan was discussed with the patient. The patient verbalized understanding and is in agreement with the plan. All medication potential side effects were discussed with the patient.    -------------------------------------------------------------------------------------------------------------------        Alfredo Ramos is a 75 y.o. female and presents with Hypertension (discuss Metroprolol . has sob and cough at times wonders if its from medication . )         Subjective:  Pt has been having a dry, irritating cough. She had such a cough when she was previously on Lisinopril. She has noticed a few episodes of fluttering in her chest, difficulty in catching her breath particularly associated with a cough, feels like she can not get a full breath of air to produce the cough, feels like she may choke (?). She has had an issue with coughing fits. We wonder about the Toprol and whether that is causing any bronchospasm. She seems to feel that the cough has been an issue for years. We started her on Toprol in Dec 2017. ROS:  Review of Systems - Negative except as above        The problem list was updated as a part of today's visit. Patient Active Problem List   Diagnosis Code    Postmenopausal Z78.0    Hyperlipidemia E78.5    Advance care planning Z71.89    Essential hypertension I10       The PSH, FH were reviewed.         SH:  Social History     Tobacco Use    Smoking status: Never Smoker    Smokeless tobacco: Never Used   Substance Use Topics    Alcohol use: Yes     Alcohol/week: 1.7 standard drinks     Types: 2 Glasses of wine per week    Drug use: No       Medications/Allergies:  Current Outpatient Medications on File Prior to Visit   Medication Sig Dispense Refill    chlorthalidone (HYGROTEN) 25 mg tablet TAKE ONE TABLET BY MOUTH EVERY DAY 90 Tab 1    atorvastatin (LIPITOR) 20 mg tablet TAKE 1 TABLET BY MOUTH ONCE DAILY 90 Tab 1    estrogens, conjugated, (PREMARIN) 0.45 mg tablet TAKE ONE TABLET BY MOUTH EVERY DAY 90 Tab 1    Cholecalciferol, Vitamin D3, (VITAMIN D) 2,000 unit Cap Take  by mouth daily.  aspirin 81 mg chewable tablet Take 81 mg by mouth daily.  calcium carbonate (CALTRATE 600) 1,500 (600) mg Tab Take  by mouth two (2) times a day. No current facility-administered medications on file prior to visit. Allergies   Allergen Reactions    Ace Inhibitors Cough    Penicillin G Rash         Health Maintenance:   Health Maintenance   Topic Date Due    Shingrix Vaccine Age 49> (1 of 2) 08/05/1994    Pneumococcal 65+ years (2 of 2 - PPSV23) 03/19/2019    Influenza Age 5 to Adult  08/01/2019    BREAST CANCER SCRN MAMMOGRAM  11/14/2019    GLAUCOMA SCREENING Q2Y  02/09/2020    MEDICARE YEARLY EXAM  03/14/2020    COLONOSCOPY  08/12/2024    DTaP/Tdap/Td series (4 - Td) 09/17/2028    Bone Densitometry (Dexa) Screening  Completed       Objective:  Visit Vitals  /70   Pulse 71   Temp 95.6 °F (35.3 °C) (Oral)   Resp 16   Ht 5' 3\" (1.6 m)   Wt 166 lb (75.3 kg)   SpO2 98%   BMI 29.41 kg/m²          Nurses notes and VS reviewed.       Physical Examination: General appearance - alert, well appearing, and in no distress          Labwork and Ancillary Studies:    CBC w/Diff  Lab Results   Component Value Date/Time    WBC 5.4 03/07/2019 08:26 AM    HGB 12.9 03/07/2019 08:26 AM    PLATELET 177 04/95/9276 08:26 AM         Basic Metabolic Profile  Lab Results   Component Value Date/Time    Sodium 140 03/07/2019 08:26 AM    Potassium 3.9 03/07/2019 08:26 AM    Chloride 102 03/07/2019 08:26 AM    CO2 31 03/07/2019 08:26 AM    Anion gap 7 03/07/2019 08:26 AM    Glucose 105 (H) 03/07/2019 08:26 AM    BUN 21 (H) 03/07/2019 08:26 AM    Creatinine 0.64 03/07/2019 08:26 AM    BUN/Creatinine ratio 33 (H) 03/07/2019 08:26 AM    GFR est AA >60 03/07/2019 08:26 AM    GFR est non-AA >60 03/07/2019 08:26 AM    Calcium 9.0 03/07/2019 08:26 AM         LFT  Lab Results   Component Value Date/Time    ALT (SGPT) 33 03/07/2019 08:26 AM    AST (SGOT) 25 03/07/2019 08:26 AM    Alk.  phosphatase 73 03/07/2019 08:26 AM    Bilirubin, direct 0.1 03/07/2019 08:26 AM    Bilirubin, total 0.4 03/07/2019 08:26 AM         Cholesterol  Lab Results   Component Value Date/Time    Cholesterol, total 164 03/07/2019 08:26 AM    HDL Cholesterol 82 (H) 03/07/2019 08:26 AM    LDL, calculated 46.6 03/07/2019 08:26 AM    Triglyceride 177 (H) 03/07/2019 08:26 AM    CHOL/HDL Ratio 2.0 03/07/2019 08:26 AM

## 2019-09-19 NOTE — PROGRESS NOTES
Mark Rivas is a 75 y.o. female (: 1945) presenting to address:    Chief Complaint   Patient presents with    Hypertension     discuss Metroprolol . has sob and cough at times wonders if its from medication . Vitals:    19 0801   BP: 151/68   Pulse: 71   Resp: 16   Temp: 95.6 °F (35.3 °C)   TempSrc: Oral   SpO2: 98%   Weight: 166 lb (75.3 kg)   Height: 5' 3\" (1.6 m)   PainSc:   0 - No pain       Hearing/Vision:   No exam data present    Learning Assessment:     Learning Assessment 3/25/2015   PRIMARY LEARNER Patient   HIGHEST LEVEL OF EDUCATION - PRIMARY LEARNER  > 4 YEARS OF COLLEGE   BARRIERS PRIMARY LEARNER NONE   CO-LEARNER CAREGIVER No   PRIMARY LANGUAGE ENGLISH   LEARNER PREFERENCE PRIMARY DEMONSTRATION   ANSWERED BY self   RELATIONSHIP SELF     Depression Screening:     3 most recent PHQ Screens 2019   Little interest or pleasure in doing things Not at all   Feeling down, depressed, irritable, or hopeless Not at all   Total Score PHQ 2 0     Fall Risk Assessment:     Fall Risk Assessment, last 12 mths 3/14/2019   Able to walk? Yes   Fall in past 12 months? No   Fall with injury? -   Number of falls in past 12 months -   Fall Risk Score -     Abuse Screening:     Abuse Screening Questionnaire 3/14/2019   Do you ever feel afraid of your partner? N   Are you in a relationship with someone who physically or mentally threatens you? N   Is it safe for you to go home? Y     Coordination of Care Questionaire:   1. Have you been to the ER, urgent care clinic since your last visit? Hospitalized since your last visit? NO    2. Have you seen or consulted any other health care providers outside of the [address] [place] [address] since your last visit? Include any pap smears or colon screening. NO    Advanced Directive:   1. Do you have an Advanced Directive? Yes     2. Would you like information on Advanced Directives?  NO    Flu shot Immunization/s administered 2019 by Dyllan Costello LPN with guardian's consent. Patient tolerated procedure well. No reactions noted.

## 2019-10-17 ENCOUNTER — OFFICE VISIT (OUTPATIENT)
Dept: FAMILY MEDICINE CLINIC | Age: 74
End: 2019-10-17

## 2019-10-17 VITALS
OXYGEN SATURATION: 100 % | BODY MASS INDEX: 29.23 KG/M2 | TEMPERATURE: 97.7 F | RESPIRATION RATE: 16 BRPM | SYSTOLIC BLOOD PRESSURE: 142 MMHG | HEART RATE: 77 BPM | DIASTOLIC BLOOD PRESSURE: 72 MMHG | HEIGHT: 63 IN | WEIGHT: 165 LBS

## 2019-10-17 DIAGNOSIS — E55.9 HYPOVITAMINOSIS D: ICD-10-CM

## 2019-10-17 DIAGNOSIS — I10 ESSENTIAL HYPERTENSION: Primary | ICD-10-CM

## 2019-10-17 NOTE — PATIENT INSTRUCTIONS
High Blood Pressure: Care Instructions  Overview    It's normal for blood pressure to go up and down throughout the day. But if it stays up, you have high blood pressure. Another name for high blood pressure is hypertension. Despite what a lot of people think, high blood pressure usually doesn't cause headaches or make you feel dizzy or lightheaded. It usually has no symptoms. But it does increase your risk of stroke, heart attack, and other problems. You and your doctor will talk about your risks of these problems based on your blood pressure. Your doctor will give you a goal for your blood pressure. Your goal will be based on your health and your age. Lifestyle changes, such as eating healthy and being active, are always important to help lower blood pressure. You might also take medicine to reach your blood pressure goal.  Follow-up care is a key part of your treatment and safety. Be sure to make and go to all appointments, and call your doctor if you are having problems. It's also a good idea to know your test results and keep a list of the medicines you take. How can you care for yourself at home? Medical treatment  · If you stop taking your medicine, your blood pressure will go back up. You may take one or more types of medicine to lower your blood pressure. Be safe with medicines. Take your medicine exactly as prescribed. Call your doctor if you think you are having a problem with your medicine. · Talk to your doctor before you start taking aspirin every day. Aspirin can help certain people lower their risk of a heart attack or stroke. But taking aspirin isn't right for everyone, because it can cause serious bleeding. · See your doctor regularly. You may need to see the doctor more often at first or until your blood pressure comes down. · If you are taking blood pressure medicine, talk to your doctor before you take decongestants or anti-inflammatory medicine, such as ibuprofen.  Some of these medicines can raise blood pressure. · Learn how to check your blood pressure at home. Lifestyle changes  · Stay at a healthy weight. This is especially important if you put on weight around the waist. Losing even 10 pounds can help you lower your blood pressure. · If your doctor recommends it, get more exercise. Walking is a good choice. Bit by bit, increase the amount you walk every day. Try for at least 30 minutes on most days of the week. You also may want to swim, bike, or do other activities. · Avoid or limit alcohol. Talk to your doctor about whether you can drink any alcohol. · Try to limit how much sodium you eat to less than 2,300 milligrams (mg) a day. Your doctor may ask you to try to eat less than 1,500 mg a day. · Eat plenty of fruits (such as bananas and oranges), vegetables, legumes, whole grains, and low-fat dairy products. · Lower the amount of saturated fat in your diet. Saturated fat is found in animal products such as milk, cheese, and meat. Limiting these foods may help you lose weight and also lower your risk for heart disease. · Do not smoke. Smoking increases your risk for heart attack and stroke. If you need help quitting, talk to your doctor about stop-smoking programs and medicines. These can increase your chances of quitting for good. When should you call for help? Call  911 anytime you think you may need emergency care. This may mean having symptoms that suggest that your blood pressure is causing a serious heart or blood vessel problem. Your blood pressure may be over 180/120.   For example, call  911 if:    · You have symptoms of a heart attack. These may include:  ? Chest pain or pressure, or a strange feeling in the chest.  ? Sweating. ? Shortness of breath. ? Nausea or vomiting. ? Pain, pressure, or a strange feeling in the back, neck, jaw, or upper belly or in one or both shoulders or arms. ? Lightheadedness or sudden weakness.   ? A fast or irregular heartbeat.     · You have symptoms of a stroke. These may include:  ? Sudden numbness, tingling, weakness, or loss of movement in your face, arm, or leg, especially on only one side of your body. ? Sudden vision changes. ? Sudden trouble speaking. ? Sudden confusion or trouble understanding simple statements. ? Sudden problems with walking or balance. ? A sudden, severe headache that is different from past headaches.     · You have severe back or belly pain.    Do not wait until your blood pressure comes down on its own. Get help right away.   Call your doctor now or seek immediate care if:    · Your blood pressure is much higher than normal (such as 180/120 or higher), but you don't have symptoms.     · You think high blood pressure is causing symptoms, such as:  ? Severe headache.  ? Blurry vision.    Watch closely for changes in your health, and be sure to contact your doctor if:    · Your blood pressure measures higher than your doctor recommends at least 2 times. That means the top number is higher or the bottom number is higher, or both.     · You think you may be having side effects from your blood pressure medicine. Where can you learn more? Go to http://aline-lakhwinder.info/. Enter M638 in the search box to learn more about \"High Blood Pressure: Care Instructions. \"  Current as of: April 9, 2019  Content Version: 12.2  © 7249-0566 [employer]. Care instructions adapted under license by [employer] (which disclaims liability or warranty for this information). If you have questions about a medical condition or this instruction, always ask your healthcare professional. [place] [address] any warranty or liability for your use of this information.

## 2019-10-17 NOTE — PROGRESS NOTES
Clary De La Cruz is a 75 y.o. female (: 1945) presenting to address:    Chief Complaint   Patient presents with    Hypertension     bp has been running in the 130's /70's at home . Vitals:    10/17/19 0903   BP: 146/82   Pulse: 77   Resp: 16   Temp: 97.7 °F (36.5 °C)   TempSrc: Oral   SpO2: 100%   Weight: 165 lb (74.8 kg)   Height: 5' 3\" (1.6 m)   PainSc:   0 - No pain       Hearing/Vision:   No exam data present    Learning Assessment:     Learning Assessment 3/25/2015   PRIMARY LEARNER Patient   HIGHEST LEVEL OF EDUCATION - PRIMARY LEARNER  > 4 YEARS OF COLLEGE   BARRIERS PRIMARY LEARNER NONE   CO-LEARNER CAREGIVER No   PRIMARY LANGUAGE ENGLISH   LEARNER PREFERENCE PRIMARY DEMONSTRATION   ANSWERED BY self   RELATIONSHIP SELF     Depression Screening:     3 most recent PHQ Screens 10/17/2019   Little interest or pleasure in doing things Not at all   Feeling down, depressed, irritable, or hopeless Not at all   Total Score PHQ 2 0     Fall Risk Assessment:     Fall Risk Assessment, last 12 mths 3/14/2019   Able to walk? Yes   Fall in past 12 months? No   Fall with injury? -   Number of falls in past 12 months -   Fall Risk Score -     Abuse Screening:     Abuse Screening Questionnaire 3/14/2019   Do you ever feel afraid of your partner? N   Are you in a relationship with someone who physically or mentally threatens you? N   Is it safe for you to go home? Y     Coordination of Care Questionaire:   1. Have you been to the ER, urgent care clinic since your last visit? Hospitalized since your last visit? NO    2. Have you seen or consulted any other health care providers outside of the [address] [place] [address] since your last visit? Include any pap smears or colon screening. NO    Advanced Directive:   1. Do you have an Advanced Directive? NO    2. Would you like information on Advanced Directives?  NO

## 2019-10-17 NOTE — PROGRESS NOTES
Assessment/Plan:    *Diagnoses and all orders for this visit:    1. Essential hypertension          [facility] in March.  BW. The plan was discussed with the patient. The patient verbalized understanding and is in agreement with the plan. All medication potential side effects were discussed with the patient.    -------------------------------------------------------------------------------------------------------------------        Carolyn Browne is a 75 y.o. female and presents with Hypertension (bp has been running in the 130's /70's at home . )         Subjective:  Pt here for f/u of HTN. Made some changes to her meds last time and her readings at home have been great, all below 140/90. Tolerating the diltiazem well. ROS:  Review of Systems - Negative         The problem list was updated as a part of today's visit. Patient Active Problem List   Diagnosis Code    Postmenopausal Z78.0    Hyperlipidemia E78.5    Advance care planning Z71.89    Essential hypertension I10       The PSH, FH were reviewed. SH:  Social History     Tobacco Use    Smoking status: Never Smoker    Smokeless tobacco: Never Used   Substance Use Topics    Alcohol use: Yes     Alcohol/week: 1.7 standard drinks     Types: 2 Glasses of wine per week    Drug use: No       Medications/Allergies:  Current Outpatient Medications on File Prior to Visit   Medication Sig Dispense Refill    dilTIAZem CD (CARDIZEM CD) 180 mg ER capsule Take 1 Cap by mouth daily. 30 Cap 1    chlorthalidone (HYGROTEN) 25 mg tablet TAKE ONE TABLET BY MOUTH EVERY DAY 90 Tab 1    atorvastatin (LIPITOR) 20 mg tablet TAKE 1 TABLET BY MOUTH ONCE DAILY 90 Tab 1    estrogens, conjugated, (PREMARIN) 0.45 mg tablet TAKE ONE TABLET BY MOUTH EVERY DAY 90 Tab 1    Cholecalciferol, Vitamin D3, (VITAMIN D) 2,000 unit Cap Take  by mouth daily.  aspirin 81 mg chewable tablet Take 81 mg by mouth daily.       calcium carbonate (CALTRATE 600) 1,500 (600) mg Tab Take  by mouth two (2) times a day. No current facility-administered medications on file prior to visit. Allergies   Allergen Reactions    Ace Inhibitors Cough    Penicillin G Rash         Health Maintenance:   Health Maintenance   Topic Date Due    Shingrix Vaccine Age 49> (1 of 2) 08/05/1994    Pneumococcal 65+ years (2 of 2 - PPSV23) 03/19/2019    BREAST CANCER SCRN MAMMOGRAM  11/14/2019    GLAUCOMA SCREENING Q2Y  02/09/2020    MEDICARE YEARLY EXAM  03/14/2020    COLONOSCOPY  08/12/2024    DTaP/Tdap/Td series (4 - Td) 09/17/2028    Bone Densitometry (Dexa) Screening  Completed    Influenza Age 9 to Adult  Completed       Objective:  Visit Vitals  /82   Pulse 77   Temp 97.7 °F (36.5 °C) (Oral)   Resp 16   Ht 5' 3\" (1.6 m)   Wt 165 lb (74.8 kg)   SpO2 100%   BMI 29.23 kg/m²          Nurses notes and VS reviewed.       Physical Examination: General appearance - alert, well appearing, and in no distress  Chest - clear to auscultation, no wheezes, rales or rhonchi, symmetric air entry  Heart - normal rate, regular rhythm, normal S1, S2, no murmurs, rubs, clicks or gallops          Labwork and Ancillary Studies:    CBC w/Diff  Lab Results   Component Value Date/Time    WBC 5.4 03/07/2019 08:26 AM    HGB 12.9 03/07/2019 08:26 AM    PLATELET 042 63/03/0995 08:26 AM         Basic Metabolic Profile  Lab Results   Component Value Date/Time    Sodium 140 03/07/2019 08:26 AM    Potassium 3.9 03/07/2019 08:26 AM    Chloride 102 03/07/2019 08:26 AM    CO2 31 03/07/2019 08:26 AM    Anion gap 7 03/07/2019 08:26 AM    Glucose 105 (H) 03/07/2019 08:26 AM    BUN 21 (H) 03/07/2019 08:26 AM    Creatinine 0.64 03/07/2019 08:26 AM    BUN/Creatinine ratio 33 (H) 03/07/2019 08:26 AM    GFR est AA >60 03/07/2019 08:26 AM    GFR est non-AA >60 03/07/2019 08:26 AM    Calcium 9.0 03/07/2019 08:26 AM         LFT  Lab Results   Component Value Date/Time    ALT (SGPT) 33 03/07/2019 08:26 AM    AST (SGOT) 25 03/07/2019 08:26 AM    Alk.  phosphatase 73 03/07/2019 08:26 AM    Bilirubin, direct 0.1 03/07/2019 08:26 AM    Bilirubin, total 0.4 03/07/2019 08:26 AM         Cholesterol  Lab Results   Component Value Date/Time    Cholesterol, total 164 03/07/2019 08:26 AM    HDL Cholesterol 82 (H) 03/07/2019 08:26 AM    LDL, calculated 46.6 03/07/2019 08:26 AM    Triglyceride 177 (H) 03/07/2019 08:26 AM    CHOL/HDL Ratio 2.0 03/07/2019 08:26 AM

## 2019-10-28 DIAGNOSIS — Z78.0 POSTMENOPAUSAL: ICD-10-CM

## 2019-10-28 RX ORDER — ATORVASTATIN CALCIUM 20 MG/1
TABLET, FILM COATED ORAL
Qty: 90 TAB | Refills: 1 | Status: SHIPPED | OUTPATIENT
Start: 2019-10-28 | End: 2020-04-21 | Stop reason: SDUPTHER

## 2020-03-03 ENCOUNTER — HOSPITAL ENCOUNTER (OUTPATIENT)
Dept: LAB | Age: 75
Discharge: HOME | End: 2020-03-03
Payer: COMMERCIAL

## 2020-03-03 DIAGNOSIS — I10 ESSENTIAL HYPERTENSION: ICD-10-CM

## 2020-03-03 LAB
ALBUMIN SERPL-MCNC: 3.5 G/DL (ref 3.4–5)
ALBUMIN/GLOB SERPL: 1 {RATIO} (ref 0.8–1.7)
ALP SERPL-CCNC: 84 U/L (ref 45–117)
ALT SERPL-CCNC: 23 U/L (ref 13–56)
ANION GAP SERPL CALC-SCNC: 5 MMOL/L (ref 3–18)
APPEARANCE UR: CLEAR
AST SERPL-CCNC: 19 U/L (ref 10–38)
BACTERIA URNS QL MICRO: ABNORMAL /HPF
BASOPHILS # BLD: 0 K/UL (ref 0–0.1)
BASOPHILS NFR BLD: 0 % (ref 0–2)
BILIRUB DIRECT SERPL-MCNC: 0.1 MG/DL (ref 0–0.2)
BILIRUB SERPL-MCNC: 0.4 MG/DL (ref 0.2–1)
BILIRUB UR QL: NEGATIVE
BUN SERPL-MCNC: 12 MG/DL (ref 7–18)
BUN/CREAT SERPL: 19 (ref 12–20)
CALCIUM SERPL-MCNC: 9.2 MG/DL (ref 8.5–10.1)
CHLORIDE SERPL-SCNC: 104 MMOL/L (ref 100–111)
CHOLEST SERPL-MCNC: 187 MG/DL
CO2 SERPL-SCNC: 31 MMOL/L (ref 21–32)
COLOR UR: YELLOW
CREAT SERPL-MCNC: 0.62 MG/DL (ref 0.6–1.3)
DIFFERENTIAL METHOD BLD: NORMAL
EOSINOPHIL # BLD: 0.1 K/UL (ref 0–0.4)
EOSINOPHIL NFR BLD: 1 % (ref 0–5)
EPITH CASTS URNS QL MICRO: ABNORMAL /LPF (ref 0–5)
ERYTHROCYTE [DISTWIDTH] IN BLOOD BY AUTOMATED COUNT: 14.5 % (ref 11.6–14.5)
GLOBULIN SER CALC-MCNC: 3.5 G/DL (ref 2–4)
GLUCOSE SERPL-MCNC: 101 MG/DL (ref 74–99)
GLUCOSE UR STRIP.AUTO-MCNC: NEGATIVE MG/DL
HCT VFR BLD AUTO: 40.7 % (ref 35–45)
HDLC SERPL-MCNC: 95 MG/DL (ref 40–60)
HDLC SERPL: 2 {RATIO} (ref 0–5)
HGB BLD-MCNC: 13 G/DL (ref 12–16)
HGB UR QL STRIP: NEGATIVE
KETONES UR QL STRIP.AUTO: NEGATIVE MG/DL
LDLC SERPL CALC-MCNC: 61.6 MG/DL (ref 0–100)
LEUKOCYTE ESTERASE UR QL STRIP.AUTO: ABNORMAL
LIPID PROFILE,FLP: ABNORMAL
LYMPHOCYTES # BLD: 1.6 K/UL (ref 0.9–3.6)
LYMPHOCYTES NFR BLD: 24 % (ref 21–52)
MCH RBC QN AUTO: 28.1 PG (ref 24–34)
MCHC RBC AUTO-ENTMCNC: 31.9 G/DL (ref 31–37)
MCV RBC AUTO: 87.9 FL (ref 74–97)
MONOCYTES # BLD: 0.6 K/UL (ref 0.05–1.2)
MONOCYTES NFR BLD: 8 % (ref 3–10)
NEUTS SEG # BLD: 4.5 K/UL (ref 1.8–8)
NEUTS SEG NFR BLD: 67 % (ref 40–73)
NITRITE UR QL STRIP.AUTO: NEGATIVE
PH UR STRIP: 8 [PH] (ref 5–8)
PLATELET # BLD AUTO: 243 K/UL (ref 135–420)
PMV BLD AUTO: 11.5 FL (ref 9.2–11.8)
POTASSIUM SERPL-SCNC: 3.5 MMOL/L (ref 3.5–5.5)
PROT SERPL-MCNC: 7 G/DL (ref 6.4–8.2)
PROT UR STRIP-MCNC: NEGATIVE MG/DL
RBC # BLD AUTO: 4.63 M/UL (ref 4.2–5.3)
RBC #/AREA URNS HPF: NEGATIVE /HPF (ref 0–5)
SODIUM SERPL-SCNC: 140 MMOL/L (ref 136–145)
SP GR UR REFRACTOMETRY: 1.01 (ref 1–1.03)
T4 FREE SERPL-MCNC: 1.1 NG/DL (ref 0.7–1.5)
TRIGL SERPL-MCNC: 152 MG/DL (ref ?–150)
TSH SERPL DL<=0.05 MIU/L-ACNC: 2.15 UIU/ML (ref 0.36–3.74)
UROBILINOGEN UR QL STRIP.AUTO: 0.2 EU/DL (ref 0.2–1)
VLDLC SERPL CALC-MCNC: 30.4 MG/DL
WBC # BLD AUTO: 6.8 K/UL (ref 4.6–13.2)
WBC URNS QL MICRO: ABNORMAL /HPF (ref 0–4)

## 2020-03-03 PROCEDURE — 80061 LIPID PANEL: CPT

## 2020-03-03 PROCEDURE — 84439 ASSAY OF FREE THYROXINE: CPT

## 2020-03-03 PROCEDURE — 80048 BASIC METABOLIC PNL TOTAL CA: CPT

## 2020-03-03 PROCEDURE — 80076 HEPATIC FUNCTION PANEL: CPT

## 2020-03-03 PROCEDURE — 81001 URINALYSIS AUTO W/SCOPE: CPT

## 2020-03-03 PROCEDURE — 84443 ASSAY THYROID STIM HORMONE: CPT

## 2020-03-03 PROCEDURE — 85025 COMPLETE CBC W/AUTO DIFF WBC: CPT

## 2020-03-03 PROCEDURE — 36415 COLL VENOUS BLD VENIPUNCTURE: CPT

## 2020-03-10 ENCOUNTER — OFFICE VISIT (OUTPATIENT)
Dept: FAMILY MEDICINE CLINIC | Age: 75
End: 2020-03-10

## 2020-03-10 VITALS
SYSTOLIC BLOOD PRESSURE: 148 MMHG | HEART RATE: 71 BPM | DIASTOLIC BLOOD PRESSURE: 80 MMHG | BODY MASS INDEX: 29.06 KG/M2 | HEIGHT: 63 IN | RESPIRATION RATE: 16 BRPM | OXYGEN SATURATION: 99 % | TEMPERATURE: 97.7 F | WEIGHT: 164 LBS

## 2020-03-10 DIAGNOSIS — E78.00 PURE HYPERCHOLESTEROLEMIA: ICD-10-CM

## 2020-03-10 DIAGNOSIS — I10 ESSENTIAL HYPERTENSION: ICD-10-CM

## 2020-03-10 DIAGNOSIS — Z00.00 MEDICARE ANNUAL WELLNESS VISIT, SUBSEQUENT: Primary | ICD-10-CM

## 2020-03-10 NOTE — PROGRESS NOTES
This is the Subsequent Medicare Annual Wellness Exam, performed 12 months or more after the Initial AWV or the last Subsequent AWV    I have reviewed the patient's medical history in detail and updated the computerized patient record. History     Patient Active Problem List   Diagnosis Code    Postmenopausal Z78.0    Hyperlipidemia E78.5    Advance care planning Z71.89    Essential hypertension I10     Past Medical History:   Diagnosis Date    Advance care planning 3/7/2016    Discussed with pt. She has one and will bring us a copy.  Essential hypertension 3/7/2016    Hypercholesterolemia     Hyperlipemia 1/20/2010    Hyperlipidemia 6/29/2012    Postmenopausal 1/20/2010      Past Surgical History:   Procedure Laterality Date    HX HYSTERECTOMY  1993    HX LUMBAR DISKECTOMY  1992    HX TONSILLECTOMY      OSTEOTOMY [address],<[address]    osteototomy leg open     Current Outpatient Medications   Medication Sig Dispense Refill    dilTIAZem CD (CARDIZEM CD) 180 mg ER capsule TAKE ONE CAPSULE BY MOUTH EVERY DAY 90 Cap 1    chlorthalidone (HYGROTEN) 25 mg tablet TAKE ONE TABLET BY MOUTH EVERY DAY 90 Tab 1    atorvastatin (LIPITOR) 20 mg tablet TAKE 1 TABLET BY MOUTH ONCE DAILY 90 Tab 1    estrogens, conjugated, (PREMARIN) 0.45 mg tablet TAKE ONE TABLET BY MOUTH EVERY DAY 90 Tab 1    Cholecalciferol, Vitamin D3, (VITAMIN D) 2,000 unit Cap Take  by mouth daily.  aspirin 81 mg chewable tablet Take 81 mg by mouth daily.  calcium carbonate (CALTRATE 600) 1,500 (600) mg Tab Take  by mouth two (2) times a day. Allergies   Allergen Reactions    Ace Inhibitors Cough    Penicillin G Rash       Family History   Problem Relation Age of Onset    Diabetes Mother     Hypertension Mother     Diabetes Father     Heart Disease Father      Social History     Tobacco Use    Smoking status: Never Smoker    Smokeless tobacco: Never Used   Substance Use Topics    Alcohol use:  Yes Alcohol/week: 1.7 standard drinks     Types: 2 Glasses of wine per week       Depression Risk Factor Screening:     3 most recent PHQ Screens 3/10/2020   Little interest or pleasure in doing things Not at all   Feeling down, depressed, irritable, or hopeless Not at all   Total Score PHQ 2 0       Alcohol Risk Factor Screening:   Do you average 1 drink per night or more than 7 drinks a week:  No    On any one occasion in the past three months have you have had more than 3 drinks containing alcohol:  No      Functional Ability and Level of Safety:   Hearing: Hearing is good. Activities of Daily Living: The home contains: no safety equipment. Patient does total self care    Ambulation: with no difficulty    Fall Risk:  Fall Risk Assessment, last 12 mths 3/10/2020   Able to walk? Yes   Fall in past 12 months? No   Fall with injury? -   Number of falls in past 12 months -   Fall Risk Score -       Abuse Screen:  Patient is not abused    Cognitive Screening   Has your family/caregiver stated any concerns about your memory: no      Patient Care Team   Patient Care Team:  Xavi Sanchez MD as PCP - General (Internal Medicine)  X[facility]  Emily Street MD (Ophthalmology)    Assessment/Plan   Education and counseling provided:  Are appropriate based on today's review and evaluation    Diagnoses and all orders for this visit:    1.  Medicare annual wellness visit, subsequent        Health Maintenance Due   Topic Date Due    Shingrix Vaccine Age 49> (1 of 2) 08/05/1994    Pneumococcal 65+ years (2 of 2 - PPSV23) 03/19/2019    GLAUCOMA SCREENING Q2Y  02/09/2020    Medicare Yearly Exam  03/14/2020

## 2020-03-10 NOTE — PATIENT INSTRUCTIONS
Medicare Wellness Visit, Female The best way to live healthy is to have a lifestyle where you eat a well-balanced diet, exercise regularly, limit alcohol use, and quit all forms of tobacco/nicotine, if applicable. Regular preventive services are another way to keep healthy. Preventive services (vaccines, screening tests, monitoring & exams) can help personalize your care plan, which helps you manage your own care. Screening tests can find health problems at the earliest stages, when they are easiest to treat. [facility] follows the current, evidence-based guidelines published by the [facility] M[facility] ([facility]) when recommending preventive services for our patients. Because we follow these guidelines, sometimes recommendations change over time as research supports it. (For example, mammograms used to be recommended annually. Even though Medicare will still pay for an annual mammogram, the newer guidelines recommend a mammogram every two years for women of average risk). Of course, you and your doctor may decide to screen more often for some diseases, based on your risk and your co-morbidities (chronic disease you are already diagnosed with). Preventive services for you include: - Medicare offers their members a free annual wellness visit, which is time for you and your primary care provider to discuss and plan for your preventive service needs. Take advantage of this benefit every year! 
-All adults over the age of 72 should receive the recommended pneumonia vaccines. Current USPSTF guidelines recommend a series of two vaccines for the best pneumonia protection.  
-All adults should have a flu vaccine yearly and a tetanus vaccine every 10 years.  
-All adults age 48 and older should receive the shingles vaccines (series of two vaccines). -All adults age 38-68 who are overweight should have a diabetes screening test once every three years. -All adults born between [birth year] and 1965 should be screened once for Hepatitis C. 
-Other screening tests and preventive services for persons with diabetes include: an eye exam to screen for diabetic retinopathy, a kidney function test, a foot exam, and stricter control over your cholesterol.  
-Cardiovascular screening for adults with routine risk involves an electrocardiogram (ECG) at intervals determined by your doctor.  
-Colorectal cancer screenings should be done for adults age 54-65 with no increased risk factors for colorectal cancer. There are a number of acceptable methods of screening for this type of cancer. Each test has its own benefits and drawbacks. Discuss with your doctor what is most appropriate for you during your annual wellness visit. The different tests include: colonoscopy (considered the best screening method), a fecal occult blood test, a fecal DNA test, and sigmoidoscopy. 
 
-A bone mass density test is recommended when a woman turns 65 to screen for osteoporosis. This test is only recommended one time, as a screening. Some providers will use this same test as a disease monitoring tool if you already have osteoporosis. -Breast cancer screenings are recommended every other year for women of normal risk, age 54-69. 
-Cervical cancer screenings for women over age 72 are only recommended with certain risk factors. Here is a list of your current Health Maintenance items (your personalized list of preventive services) with a due date: 
Health Maintenance Due Topic Date Due  Shingles Vaccine (1 of 2) 08/05/1994  Pneumococcal Vaccine (2 of 2 - PPSV23) 03/19/2019  Glaucoma Screening   02/09/2020 [address] [place] [address] Annual Well Visit  03/14/2020

## 2020-03-10 NOTE — PROGRESS NOTES
Hazel Thomas is a 75 y.o. female (: 1945) presenting to address:    Chief Complaint   Patient presents with    Annual Wellness Visit       Vitals:    03/10/20 0830   BP: 166/76   Pulse: 71   Resp: 16   Temp: 97.7 °F (36.5 °C)   TempSrc: Oral   SpO2: 99%   Weight: 164 lb (74.4 kg)   Height: 5' 3\" (1.6 m)   PainSc:   0 - No pain       Hearing/Vision:   No exam data present    Learning Assessment:     Learning Assessment 3/25/2015   PRIMARY LEARNER Patient   HIGHEST LEVEL OF EDUCATION - PRIMARY LEARNER  > 4 YEARS OF COLLEGE   BARRIERS PRIMARY LEARNER NONE   CO-LEARNER CAREGIVER No   PRIMARY LANGUAGE ENGLISH   LEARNER PREFERENCE PRIMARY DEMONSTRATION   ANSWERED BY self   RELATIONSHIP SELF     Depression Screening:     3 most recent PHQ Screens 3/10/2020   Little interest or pleasure in doing things Not at all   Feeling down, depressed, irritable, or hopeless Not at all   Total Score PHQ 2 0     Fall Risk Assessment:     Fall Risk Assessment, last 12 mths 3/10/2020   Able to walk? Yes   Fall in past 12 months? No   Fall with injury? -   Number of falls in past 12 months -   Fall Risk Score -     Abuse Screening:     Abuse Screening Questionnaire 3/14/2019   Do you ever feel afraid of your partner? N   Are you in a relationship with someone who physically or mentally threatens you? N   Is it safe for you to go home? Y     Coordination of Care Questionaire:   1. Have you been to the ER, urgent care clinic since your last visit? Hospitalized since your last visit? NO    2. Have you seen or consulted any other health care providers outside of the [address] [place] [address] since your last visit? Include any pap smears or colon screening. NO    Advanced Directive:   1. Do you have an Advanced Directive? YES    2. Would you like information on Advanced Directives?  NO

## 2020-03-10 NOTE — PROGRESS NOTES
Assessment/Plan:    *Diagnoses and all orders for this visit:    1. Medicare annual wellness visit, subsequent    2. Essential hypertension    3. Pure hypercholesterolemia          F/u 6 months. Pt typically has lower readings at home. Will monitor and return if readings are higher. The plan was discussed with the patient. The patient verbalized understanding and is in agreement with the plan. All medication potential side effects were discussed with the patient.    -------------------------------------------------------------------------------------------------------------------        Ravi Andrade is a 75 y.o. female and presents with Annual Wellness Visit         Subjective:  Pt here for routine 6 mon f/u. Has been doing really well. No complaints. HTN: well controlled at home. Readings do tend to be higher in the office. HLD:  At goal.        Review of Systems - General ROS: negative         The problem list was updated as a part of today's visit. Patient Active Problem List   Diagnosis Code    Postmenopausal Z78.0    Hyperlipidemia E78.5    Advance care planning Z71.89    Essential hypertension I10       The PSH, FH were reviewed. SH:  Social History     Tobacco Use    Smoking status: Never Smoker    Smokeless tobacco: Never Used   Substance Use Topics    Alcohol use:  Yes     Alcohol/week: 1.7 standard drinks     Types: 2 Glasses of wine per week    Drug use: No       Medications/Allergies:  Current Outpatient Medications on File Prior to Visit   Medication Sig Dispense Refill    dilTIAZem CD (CARDIZEM CD) 180 mg ER capsule TAKE ONE CAPSULE BY MOUTH EVERY DAY 90 Cap 1    chlorthalidone (HYGROTEN) 25 mg tablet TAKE ONE TABLET BY MOUTH EVERY DAY 90 Tab 1    atorvastatin (LIPITOR) 20 mg tablet TAKE 1 TABLET BY MOUTH ONCE DAILY 90 Tab 1    estrogens, conjugated, (PREMARIN) 0.45 mg tablet TAKE ONE TABLET BY MOUTH EVERY DAY 90 Tab 1    Cholecalciferol, Vitamin D3, (VITAMIN D) 2,000 unit Cap Take  by mouth daily.  aspirin 81 mg chewable tablet Take 81 mg by mouth daily.  calcium carbonate (CALTRATE 600) 1,500 (600) mg Tab Take  by mouth two (2) times a day. No current facility-administered medications on file prior to visit. Allergies   Allergen Reactions    Ace Inhibitors Cough    Penicillin G Rash         Health Maintenance:   Health Maintenance   Topic Date Due    Shingrix Vaccine Age 49> (1 of 2) 08/05/1994    Pneumococcal 65+ years (2 of 2 - PPSV23) 03/19/2019    GLAUCOMA SCREENING Q2Y  02/09/2020    Medicare Yearly Exam  03/14/2020    Lipid Screen  03/03/2021    DTaP/Tdap/Td series (4 - Td) 09/17/2028    Bone Densitometry (Dexa) Screening  Completed    Influenza Age 9 to Adult  Completed       Objective:  Visit Vitals  /80   Pulse 71   Temp 97.7 °F (36.5 °C) (Oral)   Resp 16   Ht 5' 3\" (1.6 m)   Wt 164 lb (74.4 kg)   SpO2 99%   BMI 29.05 kg/m²          Nurses notes and VS reviewed.       Physical Examination: General appearance - alert, well appearing, and in no distress  Ears - bilateral TM's and external ear canals normal  Mouth - mucous membranes moist, pharynx normal without lesions  Neck - supple, no significant adenopathy  Chest - clear to auscultation, no wheezes, rales or rhonchi, symmetric air entry  Heart - normal rate, regular rhythm, normal S1, S2, no murmurs, rubs, clicks or gallops  Abdomen - soft, nontender, nondistended, no masses or organomegaly  Musculoskeletal - no joint tenderness, deformity or swelling  Extremities - peripheral pulses normal, no pedal edema, no clubbing or cyanosis          Labwork and Ancillary Studies:    CBC w/Diff  Lab Results   Component Value Date/Time    WBC 6.8 03/03/2020 08:37 AM    HGB 13.0 03/03/2020 08:37 AM    PLATELET 792 92/40/1339 08:37 AM         Basic Metabolic Profile  Lab Results   Component Value Date/Time    Sodium 140 03/03/2020 08:37 AM    Potassium 3.5 03/03/2020 08:37 AM Chloride 104 03/03/2020 08:37 AM    CO2 31 03/03/2020 08:37 AM    Anion gap 5 03/03/2020 08:37 AM    Glucose 101 (H) 03/03/2020 08:37 AM    BUN 12 03/03/2020 08:37 AM    Creatinine 0.62 03/03/2020 08:37 AM    BUN/Creatinine ratio 19 03/03/2020 08:37 AM    GFR est AA >60 03/03/2020 08:37 AM    GFR est non-AA >60 03/03/2020 08:37 AM    Calcium 9.2 03/03/2020 08:37 AM         LFT  Lab Results   Component Value Date/Time    ALT (SGPT) 23 03/03/2020 08:37 AM    AST (SGOT) 19 03/03/2020 08:37 AM    Alk.  phosphatase 84 03/03/2020 08:37 AM    Bilirubin, direct 0.1 03/03/2020 08:37 AM    Bilirubin, total 0.4 03/03/2020 08:37 AM         Cholesterol  Lab Results   Component Value Date/Time    Cholesterol, total 187 03/03/2020 08:37 AM    HDL Cholesterol 95 (H) 03/03/2020 08:37 AM    LDL, calculated 61.6 03/03/2020 08:37 AM    Triglyceride 152 (H) 03/03/2020 08:37 AM    CHOL/HDL Ratio 2.0 03/03/2020 08:37 AM

## 2020-04-21 DIAGNOSIS — Z78.0 POSTMENOPAUSAL: ICD-10-CM

## 2020-04-21 RX ORDER — ATORVASTATIN CALCIUM 20 MG/1
TABLET, FILM COATED ORAL
Qty: 90 TAB | Refills: 1 | Status: SHIPPED | OUTPATIENT
Start: 2020-04-21 | End: 2020-10-16 | Stop reason: SDUPTHER

## 2020-05-18 ENCOUNTER — TELEPHONE (OUTPATIENT)
Dept: FAMILY MEDICINE CLINIC | Age: 75
End: 2020-05-18

## 2020-05-18 NOTE — TELEPHONE ENCOUNTER
Pt said she went to [facility] Friday 05/15/20 for pain. Pain after changing sheets from under ribs to bottom of buttocks. Treated for back spasm. Give methocarbamol 500 mg 2 tabs every 4. Pt says this is not helping and she still cannot put any weight on R leg. Sitting she is okay but standing up is painful and she cannot bear weight. Scheduled vv 05/19/20. This will be a telephone visit.

## 2020-05-19 ENCOUNTER — VIRTUAL VISIT (OUTPATIENT)
Dept: FAMILY MEDICINE CLINIC | Age: 75
End: 2020-05-19

## 2020-05-19 DIAGNOSIS — M79.604 LOW BACK PAIN RADIATING TO RIGHT LOWER EXTREMITY: ICD-10-CM

## 2020-05-19 DIAGNOSIS — M54.50 ACUTE RIGHT-SIDED LOW BACK PAIN, UNSPECIFIED WHETHER SCIATICA PRESENT: Primary | ICD-10-CM

## 2020-05-19 DIAGNOSIS — M54.50 LOW BACK PAIN RADIATING TO RIGHT LOWER EXTREMITY: ICD-10-CM

## 2020-05-19 RX ORDER — PREDNISONE 10 MG/1
TABLET ORAL
Qty: 21 TAB | Refills: 0 | Status: SHIPPED | OUTPATIENT
Start: 2020-05-19 | End: 2020-09-10 | Stop reason: ALTCHOICE

## 2020-05-19 NOTE — PROGRESS NOTES
Daniel Huang is a 75 y.o. female who was seen by synchronous (real-time) audio-video technology on 5/19/2020. Consent: Daniel Huang, who was seen by synchronous (real-time) audio-video technology, and/or her healthcare decision maker, is aware that this patient-initiated, Telehealth encounter on 5/19/2020 is a billable service, with coverage as determined by her insurance carrier. She is aware that she may receive a bill and has provided verbal consent to proceed: Yes. Assessment & Plan:   Diagnoses and all orders for this visit:    1. Acute right-sided low back pain, unspecified whether sciatica present  -     predniSONE (DELTASONE) 10 mg tablet; 40 mg daily x 2 days, 30 mg daily x 2 days, 20 mg daily x 2 days, 10 mg daily x 2 days, 5 mg daily x 2 days. -     XR SPINE LUMB 2 OR 3 V; Future        Will await the results. May need to get an Mri if pain persists. Pt will call back if steroids do not address pain. Will then call in Tramadol. 712  Subjective:   Daniel Huang is a 75 y.o. female who was seen for Back Pain    Pt was seen on a virtual visit today. Pt had an episode of back pain over the weekend, started on the RT low back and involved the RT hip, went down the side of thigh but not much further. She has hx of lumbar disketomy done by Dr. Jason Francis many years ago. She did well with this and has not had any issues since then. She was seen at urgent care this weekend, told it was muscle spasms and was treated with a muscle relaxer. She has not seen any improvement. She describes it as a burning pain, no numbness. Prior to Admission medications    Medication Sig Start Date End Date Taking? Authorizing Provider   predniSONE (DELTASONE) 10 mg tablet 40 mg daily x 2 days, 30 mg daily x 2 days, 20 mg daily x 2 days, 10 mg daily x 2 days, 5 mg daily x 2 days. 5/19/20  Yes Nesha Martini MD   chlorthalidone (HYGROTEN) 25 mg tablet Take 1 Tab by mouth daily.  5/11/20   Nesha Martini MD dilTIAZem ER (CARDIZEM CD) 180 mg capsule TAKE ONE CAPSULE BY MOUTH EVERY DAY 5/11/20   Macie Denny MD   estrogens, conjugated, (Premarin) 0.45 mg tablet TAKE ONE TABLET BY MOUTH EVERY DAY 4/21/20   Macie Denny MD   atorvastatin (LIPITOR) 20 mg tablet TAKE 1 TABLET BY MOUTH ONCE DAILY 4/21/20   Macie Denny MD   Cholecalciferol, Vitamin D3, (VITAMIN D) 2,000 unit Cap Take  by mouth daily. Provider, Historical   aspirin 81 mg chewable tablet Take 81 mg by mouth daily. Provider, Historical   calcium carbonate (CALTRATE 600) 1,500 (600) mg Tab Take  by mouth two (2) times a day. Provider, Historical     Allergies   Allergen Reactions    Ace Inhibitors Cough    Penicillin G Rash       Patient Active Problem List   Diagnosis Code    Postmenopausal Z78.0    Hyperlipidemia E78.5    Advance care planning Z71.89    Essential hypertension I10     Current Outpatient Medications   Medication Sig Dispense Refill    predniSONE (DELTASONE) 10 mg tablet 40 mg daily x 2 days, 30 mg daily x 2 days, 20 mg daily x 2 days, 10 mg daily x 2 days, 5 mg daily x 2 days. 21 Tab 0    chlorthalidone (HYGROTEN) 25 mg tablet Take 1 Tab by mouth daily. 90 Tab 0    dilTIAZem ER (CARDIZEM CD) 180 mg capsule TAKE ONE CAPSULE BY MOUTH EVERY DAY 90 Cap 0    estrogens, conjugated, (Premarin) 0.45 mg tablet TAKE ONE TABLET BY MOUTH EVERY DAY 90 Tab 1    atorvastatin (LIPITOR) 20 mg tablet TAKE 1 TABLET BY MOUTH ONCE DAILY 90 Tab 1    Cholecalciferol, Vitamin D3, (VITAMIN D) 2,000 unit Cap Take  by mouth daily.  aspirin 81 mg chewable tablet Take 81 mg by mouth daily.  calcium carbonate (CALTRATE 600) 1,500 (600) mg Tab Take  by mouth two (2) times a day. Allergies   Allergen Reactions    Ace Inhibitors Cough    Penicillin G Rash     Past Medical History:   Diagnosis Date    Advance care planning 3/7/2016    Discussed with pt. She has one and will bring us a copy.     Essential hypertension 3/7/2016  Hypercholesterolemia     Hyperlipemia 1/20/2010    Hyperlipidemia 6/29/2012    Postmenopausal 1/20/2010     Past Surgical History:   Procedure Laterality Date    HX HYSTERECTOMY  1993    HX LUMBAR DISKECTOMY  1992    HX TONSILLECTOMY      OSTEOTOMY [address],<[address]    osteototomy leg open       Review of Systems   Musculoskeletal: Positive for back pain. Objective: There were no vitals taken for this visit. General: alert, cooperative, no distress   Mental  status: normal mood, behavior, speech, dress, motor activity, and thought processes, able to follow commands   HENT: NCAT   Neck: no visualized mass   Resp: no respiratory distress   Neuro: no gross deficits   Skin: no discoloration or lesions of concern on visible areas   Psychiatric: normal affect, consistent with stated mood, no evidence of hallucinations     Additional exam findings: We discussed the expected course, resolution and complications of the diagnosis(es) in detail. Medication risks, benefits, costs, interactions, and alternatives were discussed as indicated. I advised her to contact the office if her condition worsens, changes or fails to improve as anticipated. She expressed understanding with the diagnosis(es) and plan. Lincoln Nicholas is a 75 y.o. female who was evaluated by a video visit encounter for concerns as above. Patient identification was verified prior to start of the visit. A caregiver was present when appropriate. Due to this being a TeleHealth encounter (During [place] [address] public health emergency), evaluation of the following organ systems was limited: Vitals/Constitutional/EENT/Resp/CV/GI//MS/Neuro/Skin/Heme-Lymph-Imm.   Pursuant to the emergency declaration under the [facility] waiver authority and the [employer] Act, this Virtual  Visit was conducted, with patient's (and/or legal guardian's) consent, to reduce the patient's risk of exposure to COVID-19 and provide necessary medical care. Services were provided through a video synchronous discussion virtually to substitute for in-person clinic visit. Patient and provider were located at their individual homes.       Raven Harrington MD

## 2020-05-21 ENCOUNTER — TELEPHONE (OUTPATIENT)
Dept: FAMILY MEDICINE CLINIC | Age: 75
End: 2020-05-21

## 2020-05-22 NOTE — TELEPHONE ENCOUNTER
Pt returned call. She still cannot bear weight on r leg. Unsure if prednisone is helping or not, the pain is still very bad. Pt is willing to have MRI or other meds or any other suggestions. Pt is using 500 mg tylenol bid. Pt can up tylenol to 1000 mg tid to see if pain improves. She will try this through the weekend. Please advise on testing or meds.

## 2020-05-25 NOTE — TELEPHONE ENCOUNTER
We will move ahead with getting the MRI. Give her instructions on how to get this done.   We may need to do PT.

## 2020-05-26 ENCOUNTER — TELEPHONE (OUTPATIENT)
Dept: FAMILY MEDICINE CLINIC | Age: 75
End: 2020-05-26

## 2020-05-26 DIAGNOSIS — M54.50 LOW BACK PAIN RADIATING TO RIGHT LOWER EXTREMITY: Primary | ICD-10-CM

## 2020-05-26 DIAGNOSIS — M79.604 LOW BACK PAIN RADIATING TO RIGHT LOWER EXTREMITY: Primary | ICD-10-CM

## 2020-05-26 RX ORDER — TRAMADOL HYDROCHLORIDE 50 MG/1
50 TABLET ORAL
Qty: 42 TAB | Refills: 0 | Status: SHIPPED | OUTPATIENT
Start: 2020-05-26 | End: 2020-06-09

## 2020-05-26 NOTE — TELEPHONE ENCOUNTER
Spoke to pt. She verbalized understanding about tramadol direction. Pt is scheduled for MRI 05/30/20.

## 2020-05-26 NOTE — TELEPHONE ENCOUNTER
Please notify pt I sent in Tramadol for her. It says q 8 hours but would like her to limit to once or twice max. Can use Ibuprofen in between.

## 2020-05-26 NOTE — TELEPHONE ENCOUNTER
Spoke with pt. Sending MRI order to MRI CT to schedule for [facility]. Pt says her pain is still 9/10. The tylenol and prednisone are giving minimal relief. Pt is on last day of full tabs of prednisone and tomorrow start 1/2 tabs. She is asking what else she can do for pain. She cannot bear any weight on the R leg.

## 2020-05-27 ENCOUNTER — TELEPHONE (OUTPATIENT)
Dept: FAMILY MEDICINE CLINIC | Age: 75
End: 2020-05-27

## 2020-05-27 NOTE — TELEPHONE ENCOUNTER
[facility] called to request assistance with obtaining an authorization for patients scan scheduled for 5/30.    Please call her insurance at [phone]

## 2020-05-29 DIAGNOSIS — M54.50 LOW BACK PAIN RADIATING TO RIGHT LOWER EXTREMITY: ICD-10-CM

## 2020-05-29 DIAGNOSIS — M79.604 LOW BACK PAIN RADIATING TO RIGHT LOWER EXTREMITY: ICD-10-CM

## 2020-05-29 NOTE — TELEPHONE ENCOUNTER
[employer] called from MRI  CT to check the status, she left her direct number     [phone] ex [phone]

## 2020-05-29 NOTE — TELEPHONE ENCOUNTER
Called [facility], [facility] approved [id number]. Good through August 2020. Pt scheduled tomorrow. Faxed to MRI CT.

## 2020-06-03 ENCOUNTER — TELEPHONE (OUTPATIENT)
Dept: FAMILY MEDICINE CLINIC | Age: 75
End: 2020-06-03

## 2020-06-03 NOTE — TELEPHONE ENCOUNTER
Call pt. Her MRI shows degenerative changes with mild disc bulging at various levels. They refer to the severity as being mild in most cases. In this situation, surgery would not be recommended but possibly PT. Is she willing to try this? Her other option is to see pain management for a spinal injection to relieve the pain.

## 2020-06-03 NOTE — TELEPHONE ENCOUNTER
Pt called to check on the status on her mri results. I do see that they are scanned into her chart. Please have Dr Allyson Maldonado review her chart when she can.

## 2020-06-05 ENCOUNTER — TELEPHONE (OUTPATIENT)
Dept: FAMILY MEDICINE CLINIC | Age: 75
End: 2020-06-05

## 2020-06-05 NOTE — TELEPHONE ENCOUNTER
Spoke with patient she would like to do PT and injection's . Dr Wilma Machuca notified and referral's placed .

## 2020-06-05 NOTE — TELEPHONE ENCOUNTER
Patient called regarding her results from her MRI. The patient would like to discuss her options with the nurse.

## 2020-06-16 ENCOUNTER — TELEPHONE (OUTPATIENT)
Dept: FAMILY MEDICINE CLINIC | Age: 75
End: 2020-06-16

## 2020-06-16 DIAGNOSIS — M54.50 LOW BACK PAIN RADIATING TO RIGHT LOWER EXTREMITY: Primary | ICD-10-CM

## 2020-06-16 DIAGNOSIS — M79.604 LOW BACK PAIN RADIATING TO RIGHT LOWER EXTREMITY: Primary | ICD-10-CM

## 2020-06-16 RX ORDER — TRAMADOL HYDROCHLORIDE 50 MG/1
50 TABLET ORAL
Qty: 20 TAB | Refills: 0 | Status: SHIPPED | OUTPATIENT
Start: 2020-06-16 | End: 2020-06-26

## 2020-06-16 NOTE — TELEPHONE ENCOUNTER
Pt is calling because she would like to get a refill on the following medication tramadol 50mg. Pt has appointment tomorrow for an evaluation with pain management.  In the meantime she would like to know if an refill can be issued

## 2020-06-16 NOTE — TELEPHONE ENCOUNTER
Script written 5/26 for 42 tablets .  See below message last OV 5/19/20   Future Appointments   Date Time Provider Elda Suarez   9/10/2020  8:00 AM Evan Billings  W. Brendan Mejia

## 2020-09-10 ENCOUNTER — OFFICE VISIT (OUTPATIENT)
Dept: FAMILY MEDICINE CLINIC | Age: 75
End: 2020-09-10

## 2020-09-10 VITALS
OXYGEN SATURATION: 96 % | HEIGHT: 63 IN | SYSTOLIC BLOOD PRESSURE: 130 MMHG | BODY MASS INDEX: 28.53 KG/M2 | RESPIRATION RATE: 16 BRPM | HEART RATE: 80 BPM | DIASTOLIC BLOOD PRESSURE: 80 MMHG | TEMPERATURE: 97.8 F | WEIGHT: 161 LBS

## 2020-09-10 DIAGNOSIS — E78.00 PURE HYPERCHOLESTEROLEMIA: ICD-10-CM

## 2020-09-10 DIAGNOSIS — Z23 NEEDS FLU SHOT: ICD-10-CM

## 2020-09-10 DIAGNOSIS — I10 ESSENTIAL HYPERTENSION: Primary | ICD-10-CM

## 2020-09-10 DIAGNOSIS — M54.50 ACUTE RIGHT-SIDED LOW BACK PAIN, UNSPECIFIED WHETHER SCIATICA PRESENT: ICD-10-CM

## 2020-09-10 NOTE — PATIENT INSTRUCTIONS
High Blood Pressure: Care Instructions Overview It's normal for blood pressure to go up and down throughout the day. But if it stays up, you have high blood pressure. Another name for high blood pressure is hypertension. Despite what a lot of people think, high blood pressure usually doesn't cause headaches or make you feel dizzy or lightheaded. It usually has no symptoms. But it does increase your risk of stroke, heart attack, and other problems. You and your doctor will talk about your risks of these problems based on your blood pressure. Your doctor will give you a goal for your blood pressure. Your goal will be based on your health and your age. Lifestyle changes, such as eating healthy and being active, are always important to help lower blood pressure. You might also take medicine to reach your blood pressure goal. 
Follow-up care is a key part of your treatment and safety. Be sure to make and go to all appointments, and call your doctor if you are having problems. It's also a good idea to know your test results and keep a list of the medicines you take. How can you care for yourself at home? Medical treatment · If you stop taking your medicine, your blood pressure will go back up. You may take one or more types of medicine to lower your blood pressure. Be safe with medicines. Take your medicine exactly as prescribed. Call your doctor if you think you are having a problem with your medicine. · Talk to your doctor before you start taking aspirin every day. Aspirin can help certain people lower their risk of a heart attack or stroke. But taking aspirin isn't right for everyone, because it can cause serious bleeding. · See your doctor regularly. You may need to see the doctor more often at first or until your blood pressure comes down. · If you are taking blood pressure medicine, talk to your doctor before you take decongestants or anti-inflammatory medicine, such as ibuprofen. Some of these medicines can raise blood pressure. · Learn how to check your blood pressure at home. Lifestyle changes · Stay at a healthy weight. This is especially important if you put on weight around the waist. Losing even 10 pounds can help you lower your blood pressure. · If your doctor recommends it, get more exercise. Walking is a good choice. Bit by bit, increase the amount you walk every day. Try for at least 30 minutes on most days of the week. You also may want to swim, bike, or do other activities. · Avoid or limit alcohol. Talk to your doctor about whether you can drink any alcohol. · Try to limit how much sodium you eat to less than 2,300 milligrams (mg) a day. Your doctor may ask you to try to eat less than 1,500 mg a day. · Eat plenty of fruits (such as bananas and oranges), vegetables, legumes, whole grains, and low-fat dairy products. · Lower the amount of saturated fat in your diet. Saturated fat is found in animal products such as milk, cheese, and meat. Limiting these foods may help you lose weight and also lower your risk for heart disease. · Do not smoke. Smoking increases your risk for heart attack and stroke. If you need help quitting, talk to your doctor about stop-smoking programs and medicines. These can increase your chances of quitting for good. When should you call for help? Call  911 anytime you think you may need emergency care. This may mean having symptoms that suggest that your blood pressure is causing a serious heart or blood vessel problem. Your blood pressure may be over 180/120. For example, call 911 if: 
  · You have symptoms of a heart attack. These may include: 
? Chest pain or pressure, or a strange feeling in the chest. 
? Sweating. ? Shortness of breath. ? Nausea or vomiting. ? Pain, pressure, or a strange feeling in the back, neck, jaw, or upper belly or in one or both shoulders or arms. ? Lightheadedness or sudden weakness. ? A fast or irregular heartbeat.  
  · You have symptoms of a stroke. These may include: 
? Sudden numbness, tingling, weakness, or loss of movement in your face, arm, or leg, especially on only one side of your body. ? Sudden vision changes. ? Sudden trouble speaking. ? Sudden confusion or trouble understanding simple statements. ? Sudden problems with walking or balance. ? A sudden, severe headache that is different from past headaches.  
  · You have severe back or belly pain. Do not wait until your blood pressure comes down on its own. Get help right away. Call your doctor now or seek immediate care if: 
  · Your blood pressure is much higher than normal (such as 180/120 or higher), but you don't have symptoms.  
  · You think high blood pressure is causing symptoms, such as: 
? Severe headache. 
? Blurry vision. Watch closely for changes in your health, and be sure to contact your doctor if: 
  · Your blood pressure measures higher than your doctor recommends at least 2 times. That means the top number is higher or the bottom number is higher, or both.  
  · You think you may be having side effects from your blood pressure medicine. Where can you learn more? Go to http://aline-lakhwinder.info/ Enter K098 in the search box to learn more about \"High Blood Pressure: Care Instructions. \" Current as of: December 16, 2019               Content Version: 12.6 © 0035-9857 [employer]. Care instructions adapted under license by [employer] (which disclaims liability or warranty for this information). If you have questions about a medical condition or this instruction, always ask your healthcare professional. [place] [address] any warranty or liability for your use of this information.

## 2020-09-10 NOTE — PROGRESS NOTES
Assessment/Plan:    *Diagnoses and all orders for this visit:    1. Essential hypertension    2. Pure hypercholesterolemia    3. Acute right-sided low back pain, unspecified whether sciatica present        [address] in March 2021. BW. The plan was discussed with the patient. The patient verbalized understanding and is in agreement with the plan. All medication potential side effects were discussed with the patient.    -------------------------------------------------------------------------------------------------------------------        Carolyn Pavan is a 67 y.o. female and presents with Hypertension and Ankle swelling (side effect of Cartia ? has been getting different generics . started end of july )         Subjective:  Pt here for f/u. Her RT sided sciatic pain is now resolved. She received an injection from Dr. Melissa Campoverde and she was very pleased. She feels great, back to walking. HTN: we had to change her Diltiazem based on insurance contacting us. She started to notice some mild swelling in the feet after starting this. She read that this can occur but this has improved and is now only slight. HLD:  Well controlled on last BW. Review of Systems - General ROS: positive for  - slight edema in feet. The problem list was updated as a part of today's visit. Patient Active Problem List   Diagnosis Code    Postmenopausal Z78.0    Hyperlipidemia E78.5    Advance care planning Z71.89    Essential hypertension I10       The PSH, FH were reviewed. SH:  Social History     Tobacco Use    Smoking status: Never Smoker    Smokeless tobacco: Never Used   Substance Use Topics    Alcohol use:  Yes     Alcohol/week: 1.7 standard drinks     Types: 2 Glasses of wine per week    Drug use: No       Medications/Allergies:  Current Outpatient Medications on File Prior to Visit   Medication Sig Dispense Refill    dilTIAZem ER (Cartia XT) 180 mg capsule TAKE ONE CAPSULE BY MOUTH EVERY DAY 90 Cap 0    chlorthalidone (HYGROTEN) 25 mg tablet Take 1 Tab by mouth daily. 90 Tab 0    estrogens, conjugated, (Premarin) 0.45 mg tablet TAKE ONE TABLET BY MOUTH EVERY DAY 90 Tab 1    atorvastatin (LIPITOR) 20 mg tablet TAKE 1 TABLET BY MOUTH ONCE DAILY 90 Tab 1    Cholecalciferol, Vitamin D3, (VITAMIN D) 2,000 unit Cap Take  by mouth daily.  aspirin 81 mg chewable tablet Take 81 mg by mouth daily.  calcium carbonate (CALTRATE 600) 1,500 (600) mg Tab Take  by mouth two (2) times a day.  [DISCONTINUED] chlorthalidone (HYGROTEN) 25 mg tablet TAKE ONE TABLET BY MOUTH EVERY DAY 90 Tab 0    [DISCONTINUED] dilTIAZem ER (CARDIZEM CD) 180 mg capsule TAKE ONE CAPSULE BY MOUTH EVERY DAY 90 Cap 0    [DISCONTINUED] predniSONE (DELTASONE) 10 mg tablet 40 mg daily x 2 days, 30 mg daily x 2 days, 20 mg daily x 2 days, 10 mg daily x 2 days, 5 mg daily x 2 days. 21 Tab 0     No current facility-administered medications on file prior to visit. Allergies   Allergen Reactions    Ace Inhibitors Cough    Penicillin G Rash         Health Maintenance:   Health Maintenance   Topic Date Due    Shingrix Vaccine Age 49> (1 of 2) 08/05/1994    Pneumococcal 65+ years (2 of 2 - PPSV23) 03/19/2019    Flu Vaccine (1) 09/01/2020    Lipid Screen  03/03/2021    Medicare Yearly Exam  03/11/2021    GLAUCOMA SCREENING Q2Y  09/27/2021    DTaP/Tdap/Td series (4 - Td) 09/17/2028    Bone Densitometry (Dexa) Screening  Completed       Objective:  Visit Vitals  /80   Pulse 80   Temp 97.8 °F (36.6 °C) (Oral)   Resp 16   Ht 5' 3\" (1.6 m)   Wt 161 lb (73 kg)   LMP  (LMP Unknown)   SpO2 96%   BMI 28.52 kg/m²          Nurses notes and VS reviewed.       Physical Examination: General appearance - alert, well appearing, and in no distress  Chest - clear to auscultation, no wheezes, rales or rhonchi, symmetric air entry  Heart - normal rate, regular rhythm, normal S1, S2, no murmurs, rubs, clicks or gallops  Extremities - pedal edema 1 +          Labwork and Ancillary Studies:    CBC w/Diff  Lab Results   Component Value Date/Time    WBC 6.8 03/03/2020 08:37 AM    HGB 13.0 03/03/2020 08:37 AM    PLATELET 978 26/49/6042 08:37 AM         Basic Metabolic Profile  Lab Results   Component Value Date/Time    Sodium 140 03/03/2020 08:37 AM    Potassium 3.5 03/03/2020 08:37 AM    Chloride 104 03/03/2020 08:37 AM    CO2 31 03/03/2020 08:37 AM    Anion gap 5 03/03/2020 08:37 AM    Glucose 101 (H) 03/03/2020 08:37 AM    BUN 12 03/03/2020 08:37 AM    Creatinine 0.62 03/03/2020 08:37 AM    BUN/Creatinine ratio 19 03/03/2020 08:37 AM    GFR est AA >60 03/03/2020 08:37 AM    GFR est non-AA >60 03/03/2020 08:37 AM    Calcium 9.2 03/03/2020 08:37 AM         LFT  Lab Results   Component Value Date/Time    ALT (SGPT) 23 03/03/2020 08:37 AM    Alk.  phosphatase 84 03/03/2020 08:37 AM    Bilirubin, direct 0.1 03/03/2020 08:37 AM    Bilirubin, total 0.4 03/03/2020 08:37 AM         Cholesterol  Lab Results   Component Value Date/Time    Cholesterol, total 187 03/03/2020 08:37 AM    HDL Cholesterol 95 (H) 03/03/2020 08:37 AM    LDL, calculated 61.6 03/03/2020 08:37 AM    Triglyceride 152 (H) 03/03/2020 08:37 AM    CHOL/HDL Ratio 2.0 03/03/2020 08:37 AM

## 2020-09-10 NOTE — PROGRESS NOTES
Lena De La Torre is a 67 y.o. female (: 1945) presenting to address:    Chief Complaint   Patient presents with    Hypertension    Ankle swelling     side effect of Cartia ? has been getting different generics . started end of july        Vitals:    09/10/20 0801   BP: 151/73   Pulse: 80   Resp: 16   Temp: 97.8 °F (36.6 °C)   TempSrc: Oral   SpO2: 96%   Weight: 161 lb (73 kg)   Height: 5' 3\" (1.6 m)   PainSc:   0 - No pain       Hearing/Vision:   No exam data present    Learning Assessment:     Learning Assessment 3/25/2015   PRIMARY LEARNER Patient   HIGHEST LEVEL OF EDUCATION - PRIMARY LEARNER  > 4 YEARS OF COLLEGE   BARRIERS PRIMARY LEARNER NONE   CO-LEARNER CAREGIVER No   PRIMARY LANGUAGE ENGLISH   LEARNER PREFERENCE PRIMARY DEMONSTRATION   ANSWERED BY self   RELATIONSHIP SELF     Depression Screening:     3 most recent PHQ Screens 3/10/2020   Little interest or pleasure in doing things Not at all   Feeling down, depressed, irritable, or hopeless Not at all   Total Score PHQ 2 0     Fall Risk Assessment:     Fall Risk Assessment, last 12 mths 3/10/2020   Able to walk? Yes   Fall in past 12 months? No   Fall with injury? -   Number of falls in past 12 months -   Fall Risk Score -     Abuse Screening:     Abuse Screening Questionnaire 3/14/2019   Do you ever feel afraid of your partner? N   Are you in a relationship with someone who physically or mentally threatens you? N   Is it safe for you to go home? Y     Coordination of Care Questionaire:   1. Have you been to the ER, urgent care clinic since your last visit? Hospitalized since your last visit? NO    2. Have you seen or consulted any other health care providers outside of the [address] [place] [address] since your last visit? Include any pap smears or colon screening. YES    Advanced Directive:   1. Do you have an Advanced Directive? NO    2. Would you like information on Advanced Directives?  NO    Flu shot Immunization/s administered 9/10/2020 by Magdalena Forde LPN with guardian's consent. Patient tolerated procedure well. No reactions noted.

## 2020-10-16 DIAGNOSIS — Z78.0 POSTMENOPAUSAL: ICD-10-CM

## 2020-10-16 NOTE — TELEPHONE ENCOUNTER
Patient called requesting a refill on her medication. Requested Prescriptions     Pending Prescriptions Disp Refills    estrogens, conjugated, (Premarin) 0.45 mg tablet 90 Tab 1     Sig: TAKE ONE TABLET BY MOUTH EVERY DAY    atorvastatin (LIPITOR) 20 mg tablet 90 Tab 1     Sig: TAKE 1 TABLET BY MOUTH ONCE DAILY     No future appointments.

## 2020-10-16 NOTE — TELEPHONE ENCOUNTER
Premarin last refill was 04/21/2020 and atorvastatin last refill was  04/21/2020 qty of 90. Last OV was 09/10/2020. No future appointments.

## 2020-10-18 RX ORDER — ATORVASTATIN CALCIUM 20 MG/1
TABLET, FILM COATED ORAL
Qty: 90 TAB | Refills: 1 | Status: SHIPPED | OUTPATIENT
Start: 2020-10-18

## 2020-10-23 ENCOUNTER — DOCUMENTATION ONLY (OUTPATIENT)
Dept: FAMILY MEDICINE CLINIC | Age: 75
End: 2020-10-23

## 2020-10-23 NOTE — PROGRESS NOTES
Approval received for Premarin 0.45 mg copy faxed to pharmacy   PA Case: [id number], Status: Approved, Coverage Starts on: 7/24/2020 12:00:00 AM, Coverage Ends on: 10/23/2021 12:00:00 AM.